# Patient Record
Sex: FEMALE | Race: WHITE | NOT HISPANIC OR LATINO | Employment: OTHER | URBAN - METROPOLITAN AREA
[De-identification: names, ages, dates, MRNs, and addresses within clinical notes are randomized per-mention and may not be internally consistent; named-entity substitution may affect disease eponyms.]

---

## 2017-01-18 ENCOUNTER — RADIANT APPOINTMENT (OUTPATIENT)
Dept: MAMMOGRAPHY | Facility: CLINIC | Age: 72
End: 2017-01-18
Payer: COMMERCIAL

## 2017-01-18 DIAGNOSIS — Z12.31 VISIT FOR SCREENING MAMMOGRAM: ICD-10-CM

## 2017-01-18 PROCEDURE — G0202 SCR MAMMO BI INCL CAD: HCPCS | Mod: TC

## 2017-03-31 ENCOUNTER — OFFICE VISIT (OUTPATIENT)
Dept: FAMILY MEDICINE | Facility: CLINIC | Age: 72
End: 2017-03-31
Payer: COMMERCIAL

## 2017-03-31 VITALS
SYSTOLIC BLOOD PRESSURE: 110 MMHG | HEART RATE: 65 BPM | TEMPERATURE: 96.6 F | OXYGEN SATURATION: 99 % | HEIGHT: 64 IN | WEIGHT: 120 LBS | RESPIRATION RATE: 12 BRPM | BODY MASS INDEX: 20.49 KG/M2 | DIASTOLIC BLOOD PRESSURE: 60 MMHG

## 2017-03-31 DIAGNOSIS — Z13.1 SCREENING FOR DIABETES MELLITUS: ICD-10-CM

## 2017-03-31 DIAGNOSIS — Z00.00 ROUTINE GENERAL MEDICAL EXAMINATION AT A HEALTH CARE FACILITY: Primary | ICD-10-CM

## 2017-03-31 DIAGNOSIS — R30.0 DYSURIA: ICD-10-CM

## 2017-03-31 DIAGNOSIS — R31.0 GROSS HEMATURIA: ICD-10-CM

## 2017-03-31 DIAGNOSIS — N81.0 URETHROCELE: ICD-10-CM

## 2017-03-31 DIAGNOSIS — Z78.9 NONSMOKER: ICD-10-CM

## 2017-03-31 DIAGNOSIS — Z13.220 LIPID SCREENING: ICD-10-CM

## 2017-03-31 DIAGNOSIS — Z85.42 HISTORY OF ENDOMETRIAL CANCER: ICD-10-CM

## 2017-03-31 DIAGNOSIS — N30.01 ACUTE CYSTITIS WITH HEMATURIA: ICD-10-CM

## 2017-03-31 DIAGNOSIS — N81.11 CYSTOCELE, MIDLINE: ICD-10-CM

## 2017-03-31 PROBLEM — E78.00 HYPERCHOLESTEROLEMIA: Status: ACTIVE | Noted: 2017-03-31

## 2017-03-31 LAB
ALBUMIN UR-MCNC: 30 MG/DL
APPEARANCE UR: CLEAR
BACTERIA #/AREA URNS HPF: ABNORMAL /HPF
BILIRUB UR QL STRIP: NEGATIVE
CHOLEST SERPL-MCNC: 182 MG/DL
COLOR UR AUTO: YELLOW
GLUCOSE SERPL-MCNC: 101 MG/DL (ref 70–99)
GLUCOSE UR STRIP-MCNC: NEGATIVE MG/DL
HDLC SERPL-MCNC: 61 MG/DL
HGB UR QL STRIP: ABNORMAL
KETONES UR STRIP-MCNC: 15 MG/DL
LDLC SERPL CALC-MCNC: 107 MG/DL
LEUKOCYTE ESTERASE UR QL STRIP: ABNORMAL
NITRATE UR QL: NEGATIVE
NON-SQ EPI CELLS #/AREA URNS LPF: ABNORMAL /LPF
NONHDLC SERPL-MCNC: 121 MG/DL
PH UR STRIP: 5.5 PH (ref 5–7)
RBC #/AREA URNS AUTO: ABNORMAL /HPF (ref 0–2)
SP GR UR STRIP: >1.03 (ref 1–1.03)
TRIGL SERPL-MCNC: 72 MG/DL
URN SPEC COLLECT METH UR: ABNORMAL
UROBILINOGEN UR STRIP-ACNC: 0.2 EU/DL (ref 0.2–1)
WBC #/AREA URNS AUTO: ABNORMAL /HPF (ref 0–2)

## 2017-03-31 PROCEDURE — 81001 URINALYSIS AUTO W/SCOPE: CPT | Performed by: FAMILY MEDICINE

## 2017-03-31 PROCEDURE — 36415 COLL VENOUS BLD VENIPUNCTURE: CPT | Performed by: FAMILY MEDICINE

## 2017-03-31 PROCEDURE — 80061 LIPID PANEL: CPT | Performed by: FAMILY MEDICINE

## 2017-03-31 PROCEDURE — 99397 PER PM REEVAL EST PAT 65+ YR: CPT | Performed by: FAMILY MEDICINE

## 2017-03-31 PROCEDURE — 82947 ASSAY GLUCOSE BLOOD QUANT: CPT | Performed by: FAMILY MEDICINE

## 2017-03-31 PROCEDURE — 99214 OFFICE O/P EST MOD 30 MIN: CPT | Mod: 25 | Performed by: FAMILY MEDICINE

## 2017-03-31 RX ORDER — CEPHALEXIN 500 MG/1
500 CAPSULE ORAL 3 TIMES DAILY
Qty: 30 CAPSULE | Refills: 0 | Status: SHIPPED | OUTPATIENT
Start: 2017-03-31 | End: 2017-09-11

## 2017-03-31 NOTE — PROGRESS NOTES
SUBJECTIVE:                                                            Afia Sylvester is a 71 year old female who presents for Preventive Visit.    Are you in the first 12 months of your Medicare Part B coverage?  No    Healthy Habits:    Do you get at least three servings of calcium containing foods daily (dairy, green leafy vegetables, etc.)? yes    Amount of exercise or daily activities, outside of work: 7 day(s) per week    Problems taking medications regularly No    Medication side effects: No    Have you had an eye exam in the past two years? no    Do you see a dentist twice per year? yes    Do you have sleep apnea, excessive snoring or daytime drowsiness?no    COGNITIVE SCREEN  1) Repeat 3 items (Banana, Sunrise, Chair)    2) Clock draw: NORMAL  3) 3 item recall: Recalls 3 objects  Results: 3 items recalled: COGNITIVE IMPAIRMENT LESS LIKELY    Mini-CogTM Copyright S Giana. Licensed by the author for use in [facility] second hand information company; reprinted with permission ([email or web address]@[email or web address]). All rights reserved.      URINARY TRACT SYMPTOMS      Duration: dysuria for 2 weeks + gross blood     Description  hematuria, nocturia x 0 and incontinence    Intensity:  mild    Accompanying signs and symptoms:  Fever/chills: no   Flank pain no   Nausea and vomiting: no   Vaginal symptoms: none  Abdominal/Pelvic Pain: no     History  History of frequent UTI's: no   History of kidney stones: no   Sexually Active: no   Possibility of pregnancy: No    Precipitating or alleviating factors: None    Therapies tried and outcome: fewer citric acid fruits-->  Better and drank lots of fluid      exam: ? Nodule ? On genitalia     Has known severe cystocele     VAGINAL GROWTH/MASS     -present for mos  -could be the cause of the hematuria   -nontender   -appears to be a urethrocele           Reviewed and updated as needed this visit by clinical staff    Reviewed and updated as needed this visit by Provider        Social History   Substance  Use Topics     Smoking status: Never Smoker     Smokeless tobacco: Never Used     Alcohol use Yes      Comment: wine spritzer - 1/month       The patient does not drink >3 drinks per day nor >7 drinks per week.    Today's PHQ-2 Score:   PHQ-2 ( 1999 [employer]) 3/31/2017 10/25/2013   Q1: Little interest or pleasure in doing things 0 0   Q2: Feeling down, depressed or hopeless 0 0   PHQ-2 Score 0 0       Do you feel safe in your environment - Yes    Do you have a Health Care Directive?: Yes: Advance Directive has been received and scanned.    Current providers sharing in care for this patient include:   Patient Care Team:  None as PCP - General      Hearing impairment: No    Ability to successfully perform activities of daily living: Yes, no assistance needed     Fall risk:  Fallen 2 or more times in the past year?: No  Any fall with injury in the past year?: No    Home safety:  none identified      The following health maintenance items are reviewed in Epic and correct as of today:  Health Maintenance   Topic Date Due     FALL RISK ASSESSMENT  10/25/2014     INFLUENZA VACCINE (SYSTEM ASSIGNED)  09/01/2017     LIPID SCREEN Q5 YR FEMALE (SYSTEM ASSIGNED)  10/25/2018     COLONOSCOPY Q5 YR INBASKET MESSAGE  11/11/2018     MAMMO SCREEN Q2 YR (SYSTEM ASSIGNED)  01/18/2019     ADVANCE DIRECTIVE PLANNING Q5 YRS (NO INBASKET)  11/24/2019     TETANUS IMMUNIZATION (SYSTEM ASSIGNED)  10/13/2021     DEXA SCAN SCREENING (SYSTEM ASSIGNED)  Completed     PNEUMOCOCCAL  Completed     HEPATITIS C SCREENING  Completed              ROS:  C: NEGATIVE for fever, chills, change in weight  I: NEGATIVE for worrisome rashes, moles or lesions  E: NEGATIVE for vision changes or irritation  E/M: NEGATIVE for ear, mouth and throat problems  R: NEGATIVE for significant cough or SOB  B: NEGATIVE for masses, tenderness or discharge  CV: NEGATIVE for chest pain, palpitations or peripheral edema  GI: NEGATIVE for nausea, abdominal pain, heartburn, or  "change in bowel habits   female: dysuria and hesitancy  M: NEGATIVE for significant arthralgias or myalgia  N: NEGATIVE for weakness, dizziness or paresthesias  E: NEGATIVE for temperature intolerance, skin/hair changes  H: NEGATIVE for bleeding problems  P: NEGATIVE for changes in mood or affect    Problem list, Medication list, Allergies, and Medical/Social/Surgical histories reviewed in [facility] and updated as appropriate.  Labs reviewed in EPIC  OBJECTIVE:                                                            /60 (BP Location: Right arm, Patient Position: Chair, Cuff Size: Adult Regular)  Pulse 65  Temp 96.6  F (35.9  C) (Tympanic)  Resp 12  Ht 5' 3.5\" (1.613 m)  Wt 120 lb (54.4 kg)  LMP  (LMP Unknown)  SpO2 99%  Breastfeeding? No  BMI 20.92 kg/m2 Estimated body mass index is 20.92 kg/(m^2) as calculated from the following:    Height as of this encounter: 5' 3.5\" (1.613 m).    Weight as of this encounter: 120 lb (54.4 kg).  EXAM:       ASSESSMENT / PLAN:                                                                ICD-10-CM    1. Routine general medical examination at a health care facility Z00.00    2. History of endometrial cancer Z85.42    3. Nonsmoker Z78.9        End of Life Planning:  Patient currently has an advanced directive: Yes.  Practitioner is supportive of decision.    COUNSELING:  Reviewed preventive health counseling, as reflected in patient instructions       Regular exercise       Healthy diet/nutrition        Estimated body mass index is 20.92 kg/(m^2) as calculated from the following:    Height as of this encounter: 5' 3.5\" (1.613 m).    Weight as of this encounter: 120 lb (54.4 kg).     reports that she has never smoked. She has never used smokeless tobacco.      Appropriate preventive services were discussed with this patient, including applicable screening as appropriate for cardiovascular disease, diabetes, osteopenia/osteoporosis, and glaucoma.  As appropriate for " age/gender, discussed screening for colorectal cancer, prostate cancer, breast cancer, and cervical cancer. Checklist reviewing preventive services available has been given to the patient.    Reviewed patients plan of care and provided an AVS. The Basic Care Plan (routine screening as documented in Health Maintenance) for Afia meets the Care Plan requirement. This Care Plan has been established and reviewed with the Patient.    Patient Instructions     Preventive Health Recommendations    Female Ages 65 +    Yearly exam:     See your health care provider every year in order to  o Review health changes.   o Discuss preventive care.    o Review your medicines if your doctor has prescribed any.      You no longer need a yearly Pap test unless you've had an abnormal Pap test in the past 10 years. If you have vaginal symptoms, such as bleeding or discharge, be sure to talk with your provider about a Pap test.      Every 1 to 2 years, have a mammogram.  If you are over 69, talk with your health care provider about whether or not you want to continue having screening mammograms.      Every 10 years, have a colonoscopy. Or, have a yearly FIT test (stool test). These exams will check for colon cancer.       Have a cholesterol test every 5 years, or more often if your doctor advises it.       Have a diabetes test (fasting glucose) every three years. If you are at risk for diabetes, you should have this test more often.       At age 65, have a bone density scan (DEXA) to check for osteoporosis (brittle bone disease).    Shots:    Get a flu shot each year.    Get a tetanus shot every 10 years.    Talk to your doctor about your pneumonia vaccines. There are now two you should receive - Pneumovax (PPSV 23) and Prevnar (PCV 13).    Talk to your doctor about the shingles vaccine.    Talk to your doctor about the hepatitis B vaccine.    Nutrition:     Eat at least 5 servings of fruits and vegetables each day.      Eat whole-grain  bread, whole-wheat pasta and brown rice instead of white grains and rice.      Talk to your provider about Calcium and Vitamin D.     Lifestyle    Exercise at least 150 minutes a week (30 minutes a day, 5 days a week). This will help you control your weight and prevent disease.      Limit alcohol to one drink per day.      No smoking.       Wear sunscreen to prevent skin cancer.       See your dentist twice a year for an exam and cleaning.      See your eye doctor every 1 to 2 years to screen for conditions such as glaucoma, macular degeneration and cataracts.    1. Please do your breast exam every mo, when you  Change the  calendar page or set an alarm on your cell phone Do a  visual check for dimples, inversion or indentation or any different position of the nipple Feel manually  for any 1cm or larger  size mass ie about the size of an almond Be sure to cover the entire area of both breasts : this extends back to the back on either side and from the collar bone to the bottom of the breasts where you can begin to feel ribs.    2. . Schedule your mammo at [facility]  At [address] at [phone]       3. Eat calcium : dairy and greens  Do not take calcium in pill form as it can plaque on the heart arteries and cause kidney stones  4. Colonoscopy for polyps in 2018  [place] [phone]    5. Your vitamin D is normal.  Please take 1,000 units in the summer and 2,000 units in the winter to maintain it   6. Recommend no artificial sweeteners as cause diabetes, gut frances disturbance     7. Urine lab in a mo f/u hematuria     8. GYN Spe refer     9 keflex  Qid for 2 d then tid     Time spent with the patient mins, more than 50% in counseling and coordinating care, Re above medical problems.  This time was  spent in review of data, the record, and in discussion and counselling  With > 50% face-to-face time     Exam of the UTI and the urethrocele and discussion of possibilities and the need for f/u, work up   Risk of urinary tract ca with persistent hematuria       Counseling Resources:  ATP IV Guidelines  Pooled Cohorts Equation Calculator  Breast Cancer Risk Calculator  FRAX Risk Assessment  ICSI Preventive Guidelines  Dietary Guidelines for Americans, 2010  USDA's MyPlate  ASA Prophylaxis  Lung CA Screening    Shiloh Lucio MD  [facility]

## 2017-03-31 NOTE — LETTER
"[facility]  [address] [place] [address]  [phone]    3/31/2017     Afia Sylvester  [address]  [facility] [address]    Dear Afia:    Here are the results of your latest lipid tests:    LDL Cholesterol Calculated   Date Value Ref Range Status   03/31/2017 107 (H) <100 mg/dL Final     Comment:     Above desirable:  100-129 mg/dl   Borderline High:  130-159 mg/dL   High:             160-189 mg/dL   Very high:       >189 mg/dl     ]  HDL Cholesterol   Date Value Ref Range Status   03/31/2017 61 >49 mg/dL Final   ]  Triglycerides   Date Value Ref Range Status   03/31/2017 72 <150 mg/dL Final     Comment:     Fasting specimen   ]  Cholesterol   Date Value Ref Range Status   03/31/2017 182 <200 mg/dL Final   ]    LDL is the \"bad\" cholesterol linked to heart disease and stroke.   HDL is the \"good\" choesterol and when it is high, it decreases the risk for above problems.  LDL and sugar are just a little high     Continue with a good diet and exercise  !!!  Follow a low-fat, low-cholesterol diet and get regular exercise.  Please feel free to call the clinic if you have any questions.    Sincerely,      Shiloh Lucio   "

## 2017-03-31 NOTE — NURSING NOTE
"Chief Complaint   Patient presents with     Wellness Visit     /60 (BP Location: Right arm, Patient Position: Chair, Cuff Size: Adult Regular)  Pulse 65  Temp 96.6  F (35.9  C) (Tympanic)  Resp 12  Ht 5' 3.5\" (1.613 m)  Wt 120 lb (54.4 kg)  LMP  (LMP Unknown)  SpO2 99%  Breastfeeding? No  BMI 20.92 kg/m2 Estimated body mass index is 20.92 kg/(m^2) as calculated from the following:    Height as of this encounter: 5' 3.5\" (1.613 m).    Weight as of this encounter: 120 lb (54.4 kg).  BP completed using cuff size: regular   Cheyanne Echevarria CMA    Health Maintenance Due   Topic Date Due     FALL RISK ASSESSMENT  10/25/2014     Health Maintenance reviewed at today's visit patient asked to schedule/complete:   None, Health Maintenance up to date.    "

## 2017-03-31 NOTE — MR AVS SNAPSHOT
After Visit Summary   3/31/2017    Afia Sylvester    MRN: [id number]           Patient Information     Date Of Birth          1945        Visit Information        Provider Department      3/31/2017 9:40 AM Shiloh Lucio MD [facility]        Today's Diagnoses     Routine general medical examination at a health care facility    -  1    History of endometrial cancer        Nonsmoker        Dysuria        Screening for diabetes mellitus        Lipid screening        Acute cystitis with hematuria        Gross hematuria          Care Instructions      Preventive Health Recommendations    Female Ages 65 +    Yearly exam:     See your health care provider every year in order to  o Review health changes.   o Discuss preventive care.    o Review your medicines if your doctor has prescribed any.      You no longer need a yearly Pap test unless you've had an abnormal Pap test in the past 10 years. If you have vaginal symptoms, such as bleeding or discharge, be sure to talk with your provider about a Pap test.      Every 1 to 2 years, have a mammogram.  If you are over 69, talk with your health care provider about whether or not you want to continue having screening mammograms.      Every 10 years, have a colonoscopy. Or, have a yearly FIT test (stool test). These exams will check for colon cancer.       Have a cholesterol test every 5 years, or more often if your doctor advises it.       Have a diabetes test (fasting glucose) every three years. If you are at risk for diabetes, you should have this test more often.       At age 65, have a bone density scan (DEXA) to check for osteoporosis (brittle bone disease).    Shots:    Get a flu shot each year.    Get a tetanus shot every 10 years.    Talk to your doctor about your pneumonia vaccines. There are now two you should receive - Pneumovax (PPSV 23) and Prevnar (PCV 13).    Talk to your doctor about the shingles  vaccine.    Talk to your doctor about the hepatitis B vaccine.    Nutrition:     Eat at least 5 servings of fruits and vegetables each day.      Eat whole-grain bread, whole-wheat pasta and brown rice instead of white grains and rice.      Talk to your provider about Calcium and Vitamin D.     Lifestyle    Exercise at least 150 minutes a week (30 minutes a day, 5 days a week). This will help you control your weight and prevent disease.      Limit alcohol to one drink per day.      No smoking.       Wear sunscreen to prevent skin cancer.       See your dentist twice a year for an exam and cleaning.      See your eye doctor every 1 to 2 years to screen for conditions such as glaucoma, macular degeneration and cataracts.    1. Please do your breast exam every mo, when you  Change the  calendar page or set an alarm on your cell phone Do a  visual check for dimples, inversion or indentation or any different position of the nipple Feel manually  for any 1cm or larger  size mass ie about the size of an almond Be sure to cover the entire area of both breasts : this extends back to the back on either side and from the collar bone to the bottom of the breasts where you can begin to feel ribs.    2. . Schedule your mammo at [facility]  At [address] at [phone]       3. Eat calcium : dairy and greens  Do not take calcium in pill form as it can plaque on the heart arteries and cause kidney stones  4. Colonoscopy for polyps in 2018  [phone]    5. Your vitamin D is normal.  Please take 1,000 units in the summer and 2,000 units in the winter to maintain it   6. Recommend no artificial sweeteners as cause diabetes, gut frances disturbance         Follow-ups after your visit        Future tests that were ordered for you today     Open Future Orders        Priority Expected Expires Ordered    UA reflex to Microscopic and Culture Routine  4/30/2017 3/31/2017            Who to contact     If you have  "questions or need follow up information about today's clinic visit or your schedule please contact [facility] directly at [phone].  Normal or non-critical lab and imaging results will be communicated to you by MyChart, letter or phone within 4 business days after the clinic has received the results. If you do not hear from us within 7 days, please contact the clinic through Validroidhart or phone. If you have a critical or abnormal lab result, we will notify you by phone as soon as possible.  Submit refill requests through DecoSnap or call your pharmacy and they will forward the refill request to us. Please allow 3 business days for your refill to be completed.          Additional Information About Your Visit        DecoSnap Information     Deco[employer]ap gives you secure access to your electronic health record. If you see a primary care provider, you can also send messages to your care team and make appointments. If you have questions, please call your primary care clinic.  If you do not have a primary care provider, please call [phone] and they will assist you.        Care EveryWhere ID     This is your Care EveryWhere ID. This could be used by other organizations to access your [place] medical records  [id number]        Your Vitals Were     Pulse Temperature Respirations Height Last Period Pulse Oximetry    65 96.6  F (35.9  C) (Tympanic) 12 5' 3.5\" (1.613 m) (LMP Unknown) 99%    Breastfeeding? BMI (Body Mass Index)                No 20.92 kg/m2           Blood Pressure from Last 3 Encounters:   03/31/17 110/60   10/25/13 122/70    Weight from Last 3 Encounters:   03/31/17 120 lb (54.4 kg)   10/25/13 126 lb (57.2 kg)              We Performed the Following     Glucose     Lipid panel reflex to direct LDL     OFFICE/OUTPT VISIT,EST,LEVL IV     UA reflex to Microscopic and Culture        Primary Care Provider    None       No address on file        Thank you!     Thank you for choosing " [facility]  for your care. Our goal is always to provide you with excellent care. Hearing back from our patients is one way we can continue to improve our services. Please take a few minutes to complete the written survey that you may receive in the mail after your visit with us. Thank you!             Your Updated Medication List - Protect others around you: Learn how to safely use, store and throw away your medicines at www.disposemymeds.org.          This list is accurate as of: 3/31/17 10:41 AM.  Always use your most recent med list.                   Brand Name Dispense Instructions for use    CALCIUM 600 PO          NATURAL VITAMIN D OR

## 2017-03-31 NOTE — LETTER
[facility]  [address]  [address]  [facility] [id number] [phone]                                                                                                           Afia Sylvester  [address]  [place] [address]    March 31, 2017      Dear Afia,    The results of your recent tests were reviewed and are enclosed.     1.) With the positive UA, I opted to treat for a UTI =cystitis with hematuria-gross     2.) The blood in the urine could be all from the urethrocele, but do not want to miss a UTI     3.) the urethrocele is quite large and needs to be seen /excised by Gyn   My choice is [facility]in [place]--one of the [facility] or [facility]     4.) the cystocele is evident, but not a cause of any of this  And probably could be left alone     5.) it was nice to see you today !!!       See about April 22nd !!! Tell Gloria what movie and what food you'll bring :)     Results for orders placed or performed in visit on 03/31/17   UA reflex to Microscopic and Culture   Result Value Ref Range    Color Urine Yellow     Appearance Urine Clear     Glucose Urine Negative NEG mg/dL    Bilirubin Urine Negative NEG    Ketones Urine 15 (A) NEG mg/dL    Specific Gravity Urine >1.030 1.003 - 1.035    Blood Urine Large (A) NEG    pH Urine 5.5 5.0 - 7.0 pH    Protein Albumin Urine 30 (A) NEG mg/dL    Urobilinogen Urine 0.2 0.2 - 1.0 EU/dL    Nitrite Urine Negative NEG    Leukocyte Esterase Urine Trace (A) NEG    Source Midstream Urine    Urine Microscopic   Result Value Ref Range    WBC Urine 5-10 (A) 0 - 2 /HPF    RBC Urine 5-10 (A) 0 - 2 /HPF    Squamous Epithelial /LPF Urine Few FEW /LPF    Bacteria Urine Moderate (A) NEG /HPF       Thank you for choosing [facility].  We appreciate the opportunity to serve you and look forward to supporting your healthcare needs in the future.    If you have any questions or concerns, please  call me or my staff at [phone].      Sincerely,    Shiloh Lucio MD

## 2017-03-31 NOTE — PATIENT INSTRUCTIONS
Preventive Health Recommendations    Female Ages 65 +    Yearly exam:     See your health care provider every year in order to  o Review health changes.   o Discuss preventive care.    o Review your medicines if your doctor has prescribed any.      You no longer need a yearly Pap test unless you've had an abnormal Pap test in the past 10 years. If you have vaginal symptoms, such as bleeding or discharge, be sure to talk with your provider about a Pap test.      Every 1 to 2 years, have a mammogram.  If you are over 69, talk with your health care provider about whether or not you want to continue having screening mammograms.      Every 10 years, have a colonoscopy. Or, have a yearly FIT test (stool test). These exams will check for colon cancer.       Have a cholesterol test every 5 years, or more often if your doctor advises it.       Have a diabetes test (fasting glucose) every three years. If you are at risk for diabetes, you should have this test more often.       At age 65, have a bone density scan (DEXA) to check for osteoporosis (brittle bone disease).    Shots:    Get a flu shot each year.    Get a tetanus shot every 10 years.    Talk to your doctor about your pneumonia vaccines. There are now two you should receive - Pneumovax (PPSV 23) and Prevnar (PCV 13).    Talk to your doctor about the shingles vaccine.    Talk to your doctor about the hepatitis B vaccine.    Nutrition:     Eat at least 5 servings of fruits and vegetables each day.      Eat whole-grain bread, whole-wheat pasta and brown rice instead of white grains and rice.      Talk to your provider about Calcium and Vitamin D.     Lifestyle    Exercise at least 150 minutes a week (30 minutes a day, 5 days a week). This will help you control your weight and prevent disease.      Limit alcohol to one drink per day.      No smoking.       Wear sunscreen to prevent skin cancer.       See your dentist twice a year for an exam and cleaning.      See your  eye doctor every 1 to 2 years to screen for conditions such as glaucoma, macular degeneration and cataracts.    1. Please do your breast exam every mo, when you  Change the  calendar page or set an alarm on your cell phone Do a  visual check for dimples, inversion or indentation or any different position of the nipple Feel manually  for any 1cm or larger  size mass ie about the size of an almond Be sure to cover the entire area of both breasts : this extends back to the back on either side and from the collar bone to the bottom of the breasts where you can begin to feel ribs.    2. . Schedule your mammo at [facility]  At [address] at [phone]       3. Eat calcium : dairy and greens  Do not take calcium in pill form as it can plaque on the heart arteries and cause kidney stones  4. Colonoscopy for polyps in 2018  [phone]    5. Your vitamin D is normal.  Please take 1,000 units in the summer and 2,000 units in the winter to maintain it   6. Recommend no artificial sweeteners as cause diabetes, gut frances disturbance     7. Urine lab in a mo f/u hematuria     8. GYN Spe refer     9 keflex  Qid for 2 d then tid

## 2017-05-12 ENCOUNTER — TRANSFERRED RECORDS (OUTPATIENT)
Dept: HEALTH INFORMATION MANAGEMENT | Facility: CLINIC | Age: 72
End: 2017-05-12

## 2017-08-28 ENCOUNTER — TELEPHONE (OUTPATIENT)
Dept: FAMILY MEDICINE | Facility: CLINIC | Age: 72
End: 2017-08-28

## 2017-08-28 NOTE — TELEPHONE ENCOUNTER
Reason for Call:  Other appointment    Detailed comments:  Dr Sukhjinder Sylvester is calling to see if Afia can establish  care with Dr Rodriguez      Phone Number Patient can be reached at: Other phone number:  # for , Dr Sukhjinder Sylvester    Best Time: any    Can we leave a detailed message on this number? YES    Call taken on 8/28/2017 at 11:15 AM by Brook Dickerson

## 2017-08-29 ENCOUNTER — TELEPHONE (OUTPATIENT)
Dept: FAMILY MEDICINE | Facility: CLINIC | Age: 72
End: 2017-08-29

## 2017-08-29 NOTE — TELEPHONE ENCOUNTER
Yes, I can see her - ok for regular clinic as memory clinic is backed up  Maybe a 4-5pm Monday slot?

## 2017-08-29 NOTE — TELEPHONE ENCOUNTER
Patient  Sukhjinder is calling and would like to know if Dr. Tang will accept  patient as a new patient for memories concerns and hearing concerns. Please follow up with patient      deana

## 2017-09-10 PROBLEM — N30.01 ACUTE CYSTITIS WITH HEMATURIA: Status: RESOLVED | Noted: 2017-03-31 | Resolved: 2017-09-10

## 2017-09-11 ENCOUNTER — OFFICE VISIT (OUTPATIENT)
Dept: FAMILY MEDICINE | Facility: CLINIC | Age: 72
End: 2017-09-11
Payer: COMMERCIAL

## 2017-09-11 VITALS
DIASTOLIC BLOOD PRESSURE: 77 MMHG | TEMPERATURE: 97.5 F | OXYGEN SATURATION: 100 % | SYSTOLIC BLOOD PRESSURE: 136 MMHG | HEIGHT: 64 IN | HEART RATE: 88 BPM | WEIGHT: 118.2 LBS | BODY MASS INDEX: 20.18 KG/M2

## 2017-09-11 DIAGNOSIS — H91.90 HEARING LOSS, UNSPECIFIED HEARING LOSS TYPE, UNSPECIFIED LATERALITY: ICD-10-CM

## 2017-09-11 DIAGNOSIS — F41.9 ANXIETY: ICD-10-CM

## 2017-09-11 DIAGNOSIS — R41.3 MEMORY CHANGES: Primary | ICD-10-CM

## 2017-09-11 DIAGNOSIS — M81.0 OSTEOPOROSIS, UNSPECIFIED OSTEOPOROSIS TYPE, UNSPECIFIED PATHOLOGICAL FRACTURE PRESENCE: ICD-10-CM

## 2017-09-11 LAB — HGB BLD-MCNC: 12.5 G/DL (ref 11.7–15.7)

## 2017-09-11 PROCEDURE — 80053 COMPREHEN METABOLIC PANEL: CPT | Performed by: INTERNAL MEDICINE

## 2017-09-11 PROCEDURE — 82607 VITAMIN B-12: CPT | Performed by: INTERNAL MEDICINE

## 2017-09-11 PROCEDURE — 82306 VITAMIN D 25 HYDROXY: CPT | Performed by: INTERNAL MEDICINE

## 2017-09-11 PROCEDURE — 84443 ASSAY THYROID STIM HORMONE: CPT | Performed by: INTERNAL MEDICINE

## 2017-09-11 PROCEDURE — 36415 COLL VENOUS BLD VENIPUNCTURE: CPT | Performed by: INTERNAL MEDICINE

## 2017-09-11 PROCEDURE — 99214 OFFICE O/P EST MOD 30 MIN: CPT | Performed by: INTERNAL MEDICINE

## 2017-09-11 PROCEDURE — 85018 HEMOGLOBIN: CPT | Performed by: INTERNAL MEDICINE

## 2017-09-11 RX ORDER — CHOLECALCIFEROL (VITAMIN D3) 50 MCG
1 TABLET ORAL
COMMUNITY
End: 2022-01-01

## 2017-09-11 RX ORDER — ESCITALOPRAM OXALATE 5 MG/1
5 TABLET ORAL DAILY
Qty: 30 TABLET | Refills: 2 | Status: SHIPPED | OUTPATIENT
Start: 2017-09-11 | End: 2017-12-22

## 2017-09-11 NOTE — NURSING NOTE
"Chief Complaint   Patient presents with     Memory Loss       Initial /77 (BP Location: Right arm, Patient Position: Chair, Cuff Size: Adult Regular)  Pulse 88  Temp 97.5  F (36.4  C) (Oral)  Ht 5' 3.5\" (1.613 m)  Wt 118 lb 3.2 oz (53.6 kg)  LMP  (LMP Unknown)  SpO2 100%  BMI 20.61 kg/m2 Estimated body mass index is 20.61 kg/(m^2) as calculated from the following:    Height as of this encounter: 5' 3.5\" (1.613 m).    Weight as of this encounter: 118 lb 3.2 oz (53.6 kg).  Medication Reconciliation: complete   Natalie Armando MA  "

## 2017-09-11 NOTE — PATIENT INSTRUCTIONS
Labs and flu shot today  Start the Lexapro for anxiety - ok to cut in half for the first few days if you wish to really ease into it  Hearing eval elsewhere  Follow up in about 6-8 weeks on medication and plan MoCA at that time

## 2017-09-11 NOTE — PROGRESS NOTES
"MEMORY EVALUATION CLINIC - INITIAL EVALUATION    HPI: Afia Sylvester is a very pleasant 72yoF here today with her , Sukhjinder. She and her family have some concerns of mild memory changes over the past year or so as well as increased anxiety. She does not carry a past significant history of depression or anxiety but has always been very tender-hearted. Over a year ago her young grandsons and their folks had to move out of state to the [facility] and that has been very hard on Afia as she misses them so much. There may be not only some mild memory loss but also possibly some mild judgement changes as well as some disconnect. She at times is forgetting more of the details of stories. She is sleeping well and physically feels well. No neurologic symptoms other than some hearing loss and they'd like her to have a hearing evaluation. She has had gradual decrease in hearing bilaterally without pain, tinnitus or dizziness. Her  Sukhjinder who is a retired physician already r/o wax by checking in her ears. The family is wondering more about her memory and wonder about it as \"the elephant in the room\".  too wonders about differential dx of MCI and age related memory changes.      Social Hx (Employment/Schooling):  to  Sukhjinder for several years and they do have grown children and many grandchildren. 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  Likes to go hiking.  PMH: Reviewed. Most notable for: Nothing pertinent  Is there a h/o delirium, CVA/TIA, TBI, substances, parkinsonism?: No  Family Hx: Father had h/o Parkinson's disease and her mother was \"sharp as a tack\" at 92    ADLs: Independent  Driving: Independent without difficulty  Finances: Still manages finances well  Shopping/housekeeping/meal prep: No difficulty  Medications: Just supplements  Home situation: Home with   Support: Family  Behaviors/Hallucinations/Delusions: Possibly " "mild judgement changes and slightly more tearful/emotional but has always been a tender hearted lady    REVIEW OF SYSTEMS: Detailed as above     OBJECTIVE:    /77 (BP Location: Right arm, Patient Position: Chair, Cuff Size: Adult Regular)  Pulse 88  Temp 97.5  F (36.4  C) (Oral)  Ht 5' 3.5\" (1.613 m)  Wt 118 lb 3.2 oz (53.6 kg)  LMP  (LMP Unknown)  SpO2 100%  BMI 20.61 kg/m2  Alert, robust, NAD, healthy appearing  Nicely dressed  Tender-hearted and tearful discussing her grandsons moving away  Good fund of knowledge of current events  Doesn't seem to be repetitive  No tremor  Normal speech    INVESTIGATIONS: TSH and B12 being drawn today      ASSESSMENT/PLAN:       1. Memory changes  Could be multifactorial: stress, anxiety, hearing loss, life changes  Start with control of some anxiety hopefully and then assess with MoCA  Has strong family support  - Hemoglobin  - Comprehensive metabolic panel (BMP + Alb, Alk Phos, ALT, AST, Total. Bili, TP)  - TSH with free T4 reflex  - Vitamin B12    2. Anxiety  New medication   - escitalopram (LEXAPRO) 5 MG tablet; Take 1 tablet (5 mg) by mouth daily  Dispense: 30 tablet; Refill: 2    3. Hearing loss, unspecified hearing loss type, unspecified laterality  They will schedule with audiologist    4. Osteoporosis, unspecified osteoporosis type, unspecified pathological fracture presence  - Vitamin D Deficiency    Patient Instructions   Labs and flu shot today  Start the Lexapro for anxiety - ok to cut in half for the first few days if you wish to really ease into it  Hearing eval elsewhere  Follow up in about 6-8 weeks on medication and plan MoCA at that time      Teagan Tang, DO  Internal Medicine and Geriatrics   [facility]    "

## 2017-09-11 NOTE — MR AVS SNAPSHOT
After Visit Summary   9/11/2017    Afia Sylvester    MRN: [id number]           Patient Information     Date Of Birth          1945        Visit Information        Provider Department      9/11/2017 4:00 PM Teagan Tang, DO [facility]        Today's Diagnoses     Memory changes    -  1    Anxiety        Osteoporosis, unspecified osteoporosis type, unspecified pathological fracture presence          Care Instructions    Labs and flu shot today  Start the Lexapro for anxiety - ok to cut in half for the first few days if you wish to really ease into it  Hearing eval elsewhere  Follow up in about 6-8 weeks on medication and plan MoCA at that time          Follow-ups after your visit        Who to contact     If you have questions or need follow up information about today's clinic visit or your schedule please contact [facility] directly at [phone].  Normal or non-critical lab and imaging results will be communicated to you by [address], letter or phone within 4 business days after the clinic has received the results. If you do not hear from us within 7 days, please contact the clinic through [address] or phone. If you have a critical or abnormal lab result, we will notify you by phone as soon as possible.  Submit refill requests through [employer] or call your pharmacy and they will forward the refill request to us. Please allow 3 business days for your refill to be completed.          Additional Information About Your Visit        MyChart Information     [employer] gives you secure access to your electronic health record. If you see a primary care provider, you can also send messages to your care team and make appointments. If you have questions, please call your primary care clinic.  If you do not have a primary care provider, please call [phone] and they will assist you.        Care EveryWhere ID     This is your Care EveryWhere ID. This could be used by other  "organizations to access your [facility] medical records  [id number]        Your Vitals Were     Pulse Temperature Height Last Period Pulse Oximetry BMI (Body Mass Index)    88 97.5  F (36.4  C) (Oral) 5' 3.5\" (1.613 m) (LMP Unknown) 100% 20.61 kg/m2       Blood Pressure from Last 3 Encounters:   09/11/17 136/77   03/31/17 110/60   10/25/13 122/70    Weight from Last 3 Encounters:   09/11/17 118 lb 3.2 oz (53.6 kg)   03/31/17 120 lb (54.4 kg)   10/25/13 126 lb (57.2 kg)              We Performed the Following     Comprehensive metabolic panel (BMP + Alb, Alk Phos, ALT, AST, Total. Bili, TP)     Hemoglobin     TSH with free T4 reflex     Vitamin B12     Vitamin D Deficiency          Today's Medication Changes          These changes are accurate as of: 9/11/17  4:56 PM.  If you have any questions, ask your nurse or doctor.               Start taking these medicines.        Dose/Directions    escitalopram 5 MG tablet   Commonly known as:  LEXAPRO   Used for:  Anxiety   Started by:  Teagan Tang DO        Dose:  5 mg   Take 1 tablet (5 mg) by mouth daily   Quantity:  30 tablet   Refills:  2         Stop taking these medicines if you haven't already. Please contact your care team if you have questions.     NATURAL VITAMIN D OR   Stopped by:  Teagan Tang DO                Where to get your medicines      These medications were sent to [facility] [address] [place] [address] AV AT [address] [address]    Hours:  24-hours Phone:  [phone]     escitalopram 5 MG tablet                Primary Care Provider Office Phone # Fax #    Teagan Tang [phone] [address] [facility] [address] [address]        Equal Access to Services     ROCCO MELGOZA AH: Cate bolanos hadasho Soomaali, waaxda luqadaha, qaybta kaalmada adeegyada, janneth fuller. [facility] [phone].    ATENCIÓN: Si habla " español, tiene a rose disposición servicios gratuitos de asistencia lingüística. Lino mcclain [phone].    We comply with applicable federal civil rights laws and [place] laws. We do not discriminate on the basis of race, color, national origin, age, disability sex, sexual orientation or gender identity.            Thank you!     Thank you for choosing [facility]  for your care. Our goal is always to provide you with excellent care. Hearing back from our patients is one way we can continue to improve our services. Please take a few minutes to complete the written survey that you may receive in the mail after your visit with us. Thank you!             Your Updated Medication List - Protect others around you: Learn how to safely use, store and throw away your medicines at www.disposemymeds.org.          This list is accurate as of: 9/11/17  4:56 PM.  Always use your most recent med list.                   Brand Name Dispense Instructions for use Diagnosis    CALCIUM 600 PO           escitalopram 5 MG tablet    LEXAPRO    30 tablet    Take 1 tablet (5 mg) by mouth daily    Anxiety       vitamin D 2000 UNITS tablet      Take 1 tablet by mouth daily

## 2017-09-12 LAB
ALBUMIN SERPL-MCNC: 3.9 G/DL (ref 3.4–5)
ALP SERPL-CCNC: 75 U/L (ref 40–150)
ALT SERPL W P-5'-P-CCNC: 31 U/L (ref 0–50)
ANION GAP SERPL CALCULATED.3IONS-SCNC: 6 MMOL/L (ref 3–14)
AST SERPL W P-5'-P-CCNC: 21 U/L (ref 0–45)
BILIRUB SERPL-MCNC: 0.3 MG/DL (ref 0.2–1.3)
BUN SERPL-MCNC: 23 MG/DL (ref 7–30)
CALCIUM SERPL-MCNC: 9 MG/DL (ref 8.5–10.1)
CHLORIDE SERPL-SCNC: 108 MMOL/L (ref 94–109)
CO2 SERPL-SCNC: 28 MMOL/L (ref 20–32)
CREAT SERPL-MCNC: 0.64 MG/DL (ref 0.52–1.04)
DEPRECATED CALCIDIOL+CALCIFEROL SERPL-MC: 29 UG/L (ref 20–75)
GFR SERPL CREATININE-BSD FRML MDRD: >90 ML/MIN/1.7M2
GLUCOSE SERPL-MCNC: 99 MG/DL (ref 70–99)
POTASSIUM SERPL-SCNC: 4.2 MMOL/L (ref 3.4–5.3)
PROT SERPL-MCNC: 7.2 G/DL (ref 6.8–8.8)
SODIUM SERPL-SCNC: 142 MMOL/L (ref 133–144)
TSH SERPL DL<=0.005 MIU/L-ACNC: 2.73 MU/L (ref 0.4–4)
VIT B12 SERPL-MCNC: 590 PG/ML (ref 193–986)

## 2017-09-22 ENCOUNTER — TELEPHONE (OUTPATIENT)
Dept: FAMILY MEDICINE | Facility: CLINIC | Age: 72
End: 2017-09-22

## 2017-09-22 NOTE — TELEPHONE ENCOUNTER
Reason for Call:  Request for results:    Name of test or procedure: Lab    Date of test of procedure: 9/11    Location of the test or procedure: [facility]    OK to leave the result message on voice mail or with a family member? YES    Phone number Patient can be reached at:  [phone]    Additional comments: Pt is requesting a letter with the results be mailed to he    Call taken on 9/22/2017 at 9:15 AM by Beverly Scott

## 2017-09-22 NOTE — TELEPHONE ENCOUNTER
Mailed letter with results with note of who to contact if need help with MY CHART access or want deactivated.

## 2017-09-22 NOTE — LETTER
[facility]  [address] [facility]  [address]  [place]  [address]  Tel: [phone]    September 22, 2017    Afia Sylvester  [address]  [facility] [address]        Dear Afia and Sukhjinder,     It was so nice to meet you this week!     I'm pleased to report that your lab work is all within normal limits.     Please continue with the plan of care as we discussed and let me know if you have any questions/concerns. Thanks!    Sincerely,    Teagan Tang,  / kiet           Enclosure: Lab Results    (These results are viewable via MY CHART.   If you are having trouble accessing your account call  [phone].  Ii you would like your MY CHART account de-activated so that you receive results via mail or phone in the future please let us know. )

## 2017-10-12 PROBLEM — F41.9 ANXIETY: Status: ACTIVE | Noted: 2017-10-12

## 2017-10-12 PROBLEM — H91.90 HEARING LOSS, UNSPECIFIED HEARING LOSS TYPE, UNSPECIFIED LATERALITY: Chronic | Status: ACTIVE | Noted: 2017-09-11

## 2017-10-12 PROBLEM — R41.3 MEMORY CHANGES: Status: ACTIVE | Noted: 2017-10-12

## 2017-10-12 PROBLEM — R31.0 GROSS HEMATURIA: Status: RESOLVED | Noted: 2017-03-31 | Resolved: 2017-10-12

## 2017-12-19 ENCOUNTER — TRANSFERRED RECORDS (OUTPATIENT)
Dept: HEALTH INFORMATION MANAGEMENT | Facility: CLINIC | Age: 72
End: 2017-12-19

## 2017-12-19 DIAGNOSIS — F41.9 ANXIETY: ICD-10-CM

## 2017-12-21 RX ORDER — ESCITALOPRAM OXALATE 5 MG/1
TABLET ORAL
OUTPATIENT
Start: 2017-12-21

## 2017-12-21 NOTE — TELEPHONE ENCOUNTER
Requested Prescriptions   Pending Prescriptions Disp Refills     escitalopram (LEXAPRO) 5 MG tablet [Pharmacy Med Name: ESCITALOPRAM 5MG TABLETS] 30 tablet 0     Sig: TAKE 1 TABLET(5 MG) BY MOUTH DAILY    SSRIs Protocol Passed    12/19/2017 10:35 AM       Passed - Recent or future visit with authorizing provider    Patient had office visit in the last year or has a visit in the next 30 days with authorizing provider.  See chart review.              Passed - Medication is NOT Bupropion    If the medication is Bupropion (Wellbutrin), and the patient is taking for smoking cessation; OK to refill.         Passed - Patient is age 18 or older       Passed - No active pregnancy on record       Passed - No positive pregnancy test in last 12 months        Rx refused. Duplicate request. Pharmacy notified.  Rx sent 9/11/17, #90 with 2 refills   Dahlia JAUREGUI RN

## 2017-12-22 DIAGNOSIS — F41.9 ANXIETY: ICD-10-CM

## 2017-12-27 RX ORDER — ESCITALOPRAM OXALATE 5 MG/1
TABLET ORAL
Qty: 30 TABLET | Refills: 0 | Status: SHIPPED | OUTPATIENT
Start: 2017-12-27 | End: 2018-01-09

## 2017-12-27 NOTE — TELEPHONE ENCOUNTER
Last OV 9/11/17, escitalopram started for anxiety, follow up 6-8 weeks     Next 5 appointments (look out 90 days)     Jan 09, 2018  1:30 PM CST   [facility]    [address] [facility] [address]   [phone]                Medication is being filled for 1 time refill only due to:  Patient due for med follow up    Dahlia JAUREGUI RN    Requested Prescriptions   Pending Prescriptions Disp Refills     escitalopram (LEXAPRO) 5 MG tablet [Pharmacy Med Name: ESCITALOPRAM 5MG TABLETS] 30 tablet 0     Sig: TAKE 1 TABLET(5 MG) BY MOUTH DAILY    SSRIs Protocol Passed    12/22/2017  3:13 PM       Passed - Recent or future visit with authorizing provider    Patient had office visit in the last year or has a visit in the next 30 days with authorizing provider.  See chart review.              Passed - Patient is age 18 or older       Passed - No active pregnancy on record       Passed - No positive pregnancy test in last 12 months

## 2018-01-09 ENCOUNTER — OFFICE VISIT (OUTPATIENT)
Dept: FAMILY MEDICINE | Facility: CLINIC | Age: 73
End: 2018-01-09
Payer: COMMERCIAL

## 2018-01-09 VITALS
SYSTOLIC BLOOD PRESSURE: 120 MMHG | OXYGEN SATURATION: 100 % | HEIGHT: 64 IN | WEIGHT: 115 LBS | BODY MASS INDEX: 19.63 KG/M2 | TEMPERATURE: 97.3 F | DIASTOLIC BLOOD PRESSURE: 64 MMHG | HEART RATE: 83 BPM

## 2018-01-09 DIAGNOSIS — R41.89 COGNITIVE CHANGES: Primary | ICD-10-CM

## 2018-01-09 DIAGNOSIS — F41.9 ANXIETY: ICD-10-CM

## 2018-01-09 PROCEDURE — 99215 OFFICE O/P EST HI 40 MIN: CPT | Performed by: INTERNAL MEDICINE

## 2018-01-09 RX ORDER — ESCITALOPRAM OXALATE 5 MG/1
TABLET ORAL
Qty: 30 TABLET | Refills: 0 | Status: CANCELLED | OUTPATIENT
Start: 2018-01-09

## 2018-01-09 RX ORDER — ESCITALOPRAM OXALATE 10 MG/1
10 TABLET ORAL DAILY
Qty: 90 TABLET | Refills: 1 | Status: SHIPPED | OUTPATIENT
Start: 2018-01-09 | End: 2018-08-10

## 2018-01-09 ASSESSMENT — ANXIETY QUESTIONNAIRES
1. FEELING NERVOUS, ANXIOUS, OR ON EDGE: SEVERAL DAYS
7. FEELING AFRAID AS IF SOMETHING AWFUL MIGHT HAPPEN: NOT AT ALL
3. WORRYING TOO MUCH ABOUT DIFFERENT THINGS: SEVERAL DAYS
5. BEING SO RESTLESS THAT IT IS HARD TO SIT STILL: NOT AT ALL
IF YOU CHECKED OFF ANY PROBLEMS ON THIS QUESTIONNAIRE, HOW DIFFICULT HAVE THESE PROBLEMS MADE IT FOR YOU TO DO YOUR WORK, TAKE CARE OF THINGS AT HOME, OR GET ALONG WITH OTHER PEOPLE: NOT DIFFICULT AT ALL
GAD7 TOTAL SCORE: 3
2. NOT BEING ABLE TO STOP OR CONTROL WORRYING: NOT AT ALL
6. BECOMING EASILY ANNOYED OR IRRITABLE: NOT AT ALL

## 2018-01-09 ASSESSMENT — PATIENT HEALTH QUESTIONNAIRE - PHQ9: 5. POOR APPETITE OR OVEREATING: SEVERAL DAYS

## 2018-01-09 NOTE — NURSING NOTE
"Chief Complaint   Patient presents with     Follow Up For     Memory Loss       Initial /64 (BP Location: Right arm, Cuff Size: Adult Regular)  Pulse 83  Temp 97.3  F (36.3  C) (Oral)  Ht 5' 3.5\" (1.613 m)  Wt 115 lb (52.2 kg)  LMP  (LMP Unknown)  SpO2 100%  BMI 20.05 kg/m2 Estimated body mass index is 20.05 kg/(m^2) as calculated from the following:    Height as of this encounter: 5' 3.5\" (1.613 m).    Weight as of this encounter: 115 lb (52.2 kg).  Medication Reconciliation: complete       Ana Garrett CMA      "

## 2018-01-09 NOTE — PROGRESS NOTES
"  SUBJECTIVE:   Afia Sylvester is a 72 year old female who presents to clinic today for the following health issues:    Afia is here with  Sukhjinder and daughter Dahlia to f/u on memory changes and anxiety. Several months ago Lexapro was started.    Says Lexapro is helpful but still has some worries about dementia; still functioning well and remembers to take her medications. Says she doesn't forget anything big and Sukhjinder verifies this to be true. Making appointments on time and not loosing things. Feels totally capable driving; there was only one episode of getting anxious b/c of going to a new place and it was getting dark out in an unfamiliar area of town and was racing to get to grandson's  before it closed so she problem solved and called her daughter who came and helped her out which was good. She drove home from the cabin just two days ago with Sukhjinder riding along with her and she did very well according to Sukhjinder. Incidentally they have decided at night to always ride together for each of their own benefits d/t age.    Sukhjinder says she does have repetition of questions and some \"unawareness\" of past conversations which she thinks may be r/t hearing loss (currently being evaluated by ENT and audiology).     No side effects from Lexapro at all but within the first few weeks she was more relaxed overall. Had a wonderful holiday and she made several large meals that turned out well for the family. Dahlia would like her mom to eat more as is concerned her mom prepares meals for others and forgets to eat. Also the kids noted they have appreciated the Lexapro as Afia is more relaxed and calls the kids worried less often.    Looking forward to visiting family out [facility] in early April.    Problem list and histories reviewed & adjusted, as indicated.    ROS:  Detailed as above     OBJECTIVE:     /64 (BP Location: Right arm, Cuff Size: Adult Regular)  Pulse 83  Temp 97.3  F (36.3  C) (Oral)  Ht 5' 3.5\" (1.613 " "m)  Wt 115 lb (52.2 kg)  LMP  (LMP Unknown)  SpO2 100%  BMI 20.05 kg/m2  Body mass index is 20.05 kg/(m^2).  Very tearful at times and seems to get wrapped up in her own thoughts and frustrated at herself for not knowing some portions of the MoCA  Eventually able to get Luria appropriately with some practice  Walks without assist device  ALESSANDRO-7 SCORE 1/9/2018   Total Score 3   MoCA 21/30 (2, 3, 1, 1, 0, 0, 1, 2, 5, 6). Struggled with Trails B and cube. Able to draw the [place] of the clock with all the correct numbers but struggled over hand placement (couldn't get past thinking about the \"10\"). Visibly shaking and anxious as she was writing. 3/3 naming. Struggled with serial 7s. Unable to spell \"world\" backwards. Also got very mixed up with the sentence repetition and we even tried a few times to go over this but still she struggled and she was getting more anxious at this point. 12 \"F\" words in a minute. Then did beautifully the rest of the test with 2/2 abstraction, 5/5 delayed recall (I was happily surprised especially given how she was so anxious early on in the test) and 6/6 orientation.    ASSESSMENT/PLAN:       Anxiety & Cognitive impairment   Try to maximize anxiety therapy with increased dose of Lexapro. Though she is quite anxious today and overwhelmed/tearful at times, family has reported that they have seen a clear improvement in her disposition since starting the Lexapro 5 mg dose. Will increase to 10 mg and even could eventually increase to 20 mg dose.  Has strong, supportive family. Encouraged further investigation of cognitive changes with occupational therapy and speech therapy (surprisingly had difficulty in language portions of MoCA though conversation seems good) and they are willing to follow through on this.  Is working with ENT on hearing though she seemed to understand me quite well in casual conversation today.  I'm concerned though that this is early dementia given the changes noted (and " it was especially striking her difficulty with hand placement on the clock)  She [place]  and we discussed that will be slightly a test - see me before/after that  - escitalopram (LEXAPRO) 10 MG tablet; Take 1 tablet (10 mg) by mouth daily  Dispense: 90 tablet; Refill: 1  - OCCUPATIONAL THERAPY REFERRAL  - SPEECH THERAPY REFERRAL    Patient Instructions   Increase Lexapro to 10 mg daily    Schedule occupational therapy and speech therapy and follow up with me in the springtime    MDM: >60 minutes spent with patient/family, over 50% time counseling, coordinating care and explaining about nature of the patient's conditions.    Teagan Tang, [facility]

## 2018-01-09 NOTE — MR AVS SNAPSHOT
"              After Visit Summary   1/9/2018    Afia Sylvester    MRN: [id number]           Patient Information     Date Of Birth          1945        Visit Information        Provider Department      1/9/2018 1:30 PM Teagan Tang, DO [facility]        Today's Diagnoses     Cognitive changes    -  1    Anxiety          Care Instructions    Increase Lexapro to 10 mg daily    Schedule occupational therapy and speech therapy and follow up with me in the springtime          Follow-ups after your visit        Additional Services     OCCUPATIONAL THERAPY REFERRAL       *This therapy referral will be filtered to a centralized scheduling office at [facility] and the patient will receive a call to schedule an appointment at a [place] location most convenient for them. *     [facility] provides Occupational Therapy evaluation and treatment and many specialty services across the [place] system.  If requesting a specialty program, please choose from the list below.    If you have not heard from the scheduling office within 2 business days, please call [phone] for all locations, with the exception of [place], please call [phone].     Treatment: Evaluation & Treatment  Special Instructions/Modalities: CPT  Special Programs: Cognition Assessment     Please be aware that coverage of these services is subject to the terms and limitations of your health insurance plan.  Call member services at your health plan with any benefit or coverage questions.      **Note to Provider:  If you are referring outside of [place] for the therapy appointment, please list the name of the location in the \"special instructions\" above, print the referral and give to the patient to schedule the appointment.            SPEECH THERAPY REFERRAL       *This therapy referral will be filtered to a centralized scheduling office at [facility] and the patient will " "receive a call to schedule an appointment at a [place] location most convenient for them. *     [facility] provides Speech Therapy evaluation and treatment and many specialty services across the [place] system.  If requesting a specialty program, please choose from the list below.  If you have not heard from the scheduling office within 2 business days, please call [phone] for all locations, with the exception of [place], please call [phone].       Treatment: Evaluation & Treatment  Speech Treatment Diagnosis: Cognitive Deficits  Special Instructions: Focus on language  Special Programs: None    Please be aware that coverage of these services is subject to the terms and limitations of your health insurance plan.  Call member services at your health plan with any benefit or coverage questions.      **Note to Provider:  If you are referring outside of [place] for the therapy appointment, please list the name of the location in the \"special instructions\" above, print the referral and give to the patient to schedule the appointment.                  Who to contact     If you have questions or need follow up information about today's clinic visit or your schedule please contact [facility] [place] directly at [phone].  Normal or non-critical lab and imaging results will be communicated to you by Manymoonhart, letter or phone within 4 business days after the clinic has received the results. If you do not hear from us within 7 days, please contact the clinic through Manymoonhart or phone. If you have a critical or abnormal lab result, we will notify you by phone as soon as possible.  Submit refill requests through Unified or call your pharmacy and they will forward the refill request to us. Please allow 3 business days for your refill to be completed.          Additional Information About Your Visit        Unified Information     Unified gives you secure access to your electronic health " "record. If you see a primary care provider, you can also send messages to your care team and make appointments. If you have questions, please call your primary care clinic.  If you do not have a primary care provider, please call [phone] and they will assist you.        Care EveryWhere ID     This is your Care EveryWhere ID. This could be used by other organizations to access your [place] medical records  [id number]        Your Vitals Were     Pulse Temperature Height Last Period Pulse Oximetry BMI (Body Mass Index)    83 97.3  F (36.3  C) (Oral) 5' 3.5\" (1.613 m) (LMP Unknown) 100% 20.05 kg/m2       Blood Pressure from Last 3 Encounters:   01/09/18 120/64   09/11/17 136/77   03/31/17 110/60    Weight from Last 3 Encounters:   01/09/18 115 lb (52.2 kg)   09/11/17 118 lb 3.2 oz (53.6 kg)   03/31/17 120 lb (54.4 kg)              We Performed the Following     OCCUPATIONAL THERAPY REFERRAL     SPEECH THERAPY REFERRAL          Today's Medication Changes          These changes are accurate as of: 1/9/18  2:35 PM.  If you have any questions, ask your nurse or doctor.               These medicines have changed or have updated prescriptions.        Dose/Directions    escitalopram 10 MG tablet   Commonly known as:  LEXAPRO   This may have changed:  See the new instructions.   Used for:  Anxiety   Changed by:  Teagan Tang DO        Dose:  10 mg   Take 1 tablet (10 mg) by mouth daily   Quantity:  90 tablet   Refills:  1            Where to get your medicines      These medications were sent to [facility] [address] [place] [address] AT [address] [address]     Phone:  [phone]     escitalopram 10 MG tablet                Primary Care Provider Office Phone # Fax #    Teagan Tang [phone] [address] [facility] [address] [facility] [address]  [place] [address]        Equal Access to Services     ROCCO MELGOZA AH: Cate pinzon " Yulisa, donato armendarizcresencio, amandata kapeggy alarcon, janneth rose marymargarita gross luperaheem laKeylaevonne jonny. So [facility] [phone].    ATENCIÓN: Si habla cordell, tiene a rose disposición servicios gratuitos de asistencia lingüística. Lino al [phone].    We comply with applicable federal civil rights laws and [place] laws. We do not discriminate on the basis of race, color, national origin, age, disability, sex, sexual orientation, or gender identity.            Thank you!     Thank you for choosing [facility]  for your care. Our goal is always to provide you with excellent care. Hearing back from our patients is one way we can continue to improve our services. Please take a few minutes to complete the written survey that you may receive in the mail after your visit with us. Thank you!             Your Updated Medication List - Protect others around you: Learn how to safely use, store and throw away your medicines at www.disposemymeds.org.          This list is accurate as of: 1/9/18  2:35 PM.  Always use your most recent med list.                   Brand Name Dispense Instructions for use Diagnosis    CALCIUM 600 PO           escitalopram 10 MG tablet    LEXAPRO    90 tablet    Take 1 tablet (10 mg) by mouth daily    Anxiety       vitamin D 2000 UNITS tablet      Take 1 tablet by mouth daily

## 2018-01-10 ASSESSMENT — ANXIETY QUESTIONNAIRES: GAD7 TOTAL SCORE: 3

## 2018-01-12 ENCOUNTER — TRANSFERRED RECORDS (OUTPATIENT)
Dept: HEALTH INFORMATION MANAGEMENT | Facility: CLINIC | Age: 73
End: 2018-01-12

## 2018-03-06 ENCOUNTER — HOSPITAL ENCOUNTER (OUTPATIENT)
Dept: OCCUPATIONAL THERAPY | Facility: CLINIC | Age: 73
Setting detail: THERAPIES SERIES
End: 2018-03-06
Attending: INTERNAL MEDICINE
Payer: MEDICARE

## 2018-03-06 PROCEDURE — G9169 MEMORY GOAL STATUS: HCPCS | Mod: GO,CJ | Performed by: REHABILITATION PRACTITIONER

## 2018-03-06 PROCEDURE — 97535 SELF CARE MNGMENT TRAINING: CPT | Mod: GO | Performed by: REHABILITATION PRACTITIONER

## 2018-03-06 PROCEDURE — G9168 MEMORY CURRENT STATUS: HCPCS | Mod: GO,CJ | Performed by: REHABILITATION PRACTITIONER

## 2018-03-06 PROCEDURE — 40000242 ZZH STATISTIC VISIT MEMORY CLINIC: Performed by: REHABILITATION PRACTITIONER

## 2018-03-06 PROCEDURE — 96125 COGNITIVE TEST BY HC PRO: CPT | Mod: GO | Performed by: REHABILITATION PRACTITIONER

## 2018-03-06 PROCEDURE — 97165 OT EVAL LOW COMPLEX 30 MIN: CPT | Mod: GO | Performed by: REHABILITATION PRACTITIONER

## 2018-03-06 NOTE — PROGRESS NOTES
[facility]          OUTPATIENT OCCUPATIONAL THERAPY  EVALUATION  PLAN OF TREATMENT FOR OUTPATIENT REHABILITATION  (COMPLETE FOR INITIAL CLAIMS ONLY)  Patient's Last Name, First Name, M.I.  YOB: 1945  Afia Sylvester                        Provider's Name  [facility] Medical Record No.  [id number]                               Onset Date:     01/09/18   Start of Care Date:     03/06/18   Type:     ___PT   _X_OT   ___SLP Medical Diagnosis:     Cognitive changes, anxiety                          OT Diagnosis:     Impaired memory impacting ADL/IADL I Visits from SOC:  1   _________________________________________________________________________________  Plan of Treatment/Functional Goals:  ADL training, IADL training, Cognitive performance testing     Pt and  report they are hoping to get some objective information from todays visit.   concerned about dementia              Goals  Goal Identifier: 1 CPT  Goal Description: Pt will participate in CPT and pt/family will verbalize understanding of results and recommendations.  Target Date: 04/03/18     Goal Identifier: 2 Memory compensation strategies  Goal Description: Pt will identify 2 memory compensation strategies and use 7/7 days to improve recall for ADL/IADL's and conversations.  Target Date: 04/03/18     Goal Identifier: 3 Community reintegration  Goal Description: Pt will score within normal ranges in multiple subtests correlated with community reintegration including crossing street, running an errand and navigation.    Target Date: 04/03/18          Therapy Frequency: 1x/week      Predicted Duration of Therapy Intervention (days/wks): 4 weeks  Karen Juarez OT          I CERTIFY THE NEED FOR THESE SERVICES FURNISHED UNDER        THIS PLAN OF TREATMENT AND WHILE UNDER MY CARE     (Physician co-signature of  this document indicates review and certification of the therapy plan).                            Referring Physician: Dr. Teagan Tang     Initial Assessment        See Epic Evaluation      Start Of Care Date: 03/06/18

## 2018-03-06 NOTE — PROGRESS NOTES
"   03/06/18 0900   Quick Adds   Type of Visit Initial Outpatient Occupational Therapy Evaluation       Present No   General Information   Start Of Care Date 03/06/18   Referring Physician Dr. Teagan Tang   Orders Evaluate and treat as indicated   Other Orders SLP   Orders Date 01/09/18   Medical Diagnosis Cognitive changes, anxiety   Onset of Illness/Injury or Date of Surgery 01/09/18   Precautions/Limitations No known precautions/limitations   Special Instructions CPT, cognitive assessment   Surgical/Medical History Reviewed Yes   Additional Occupational Profile Info/Pertinent History of Current Problem Pt and  present for evaluation.   concerned about recall and asking same questions without recognizing she is repeating herself.     Role/Living Environment   Current Community Support Family/friend caregiver   Patient role/Employment history Retired   Community/Avocational Activities Pt is a retired nurse, Pt has 3 daughters (2 are local) and has 5 grandkids.  Pt is working on organizing paperwork, Pt enjoys walking.     Current Living Environment House   Prior Level - Transfers Independent   Prior Level - Ambulation Independent   Prior Level - ADLS Independent   Prior Responsibilities - IADL Meal Preparation;Laundry;Housekeeping;Shopping;Yardwork;Medication management;Finances;Driving;   Prior Level Comments Pt cares for grandkids at times.     Patient/family Goals Statement \"To know how bad my memory is.\"   Pain   Patient currently in pain No   Fall Risk Screen   Fall screen completed by OT   Have you fallen 2 or more times in the past year? No   Have you fallen and had an injury in the past year? No   Cognitive Status Examination   Level of Consciousness Alert   Follows Commands and Answers Questions 100% of the time   Personal Safety and Judgment Intact   Memory Impaired   Memory Comments Impaired recall of conversations   Executive Function Planning ability " impaired   Cognitive Comment Pt reports she tends to save things, handles her papers often.    Visual Perception   Visual Perception No deficits were identified   Activity Tolerance   Activity Tolerance Pt reports balance, strength and good endurance level.  Pt is independent with basic ADL I.   Instrumental Activities of Daily Living Assessment   IADL Assessment/Observations Pt is currently driving, does not drive on highway with grandkids, limits driving at night, drives with  at night, prefers side roads.     Planned Therapy Interventions   Planned Therapy Interventions ADL training;IADL training;Cognitive performance testing   Intervention Comments Pt and  report they are hoping to get some objective information from todays visit.   concerned about dementia   Adult OT Eval Goals   OT Eval Goals (Adult) 1;2   OT Goal 1   Goal Identifier 1 CPT   Goal Description Pt will participate in CPT and pt/family will verbalize understanding of results and recommendations.   Target Date 04/03/18   OT Goal 2   Goal Identifier 2 Memory compensation strategies   Goal Description Pt will identify 2 memory compensation strategies and use 7/7 days to improve recall for ADL/IADL's and conversations.   Target Date 04/03/18   Clinical Impression   Criteria for Skilled Therapeutic Interventions Met Yes, treatment indicated   OT Diagnosis Impaired memory impacting ADL/IADL I   Influenced by the following impairments Memory and anxiety   Assessment of Occupational Performance 1-3 Performance Deficits   Identified Performance Deficits Recall for ADL's and managing complex IADL tasks   Clinical Decision Making (Complexity) Low complexity   Therapy Frequency 1x/week    Predicted Duration of Therapy Intervention (days/wks) 4 weeks   Risks and Benefits of Treatment have been explained. Yes   Patient, Family & other staff in agreement with plan of care Yes   Education Assessment   Barriers To Learning Emotional;Cognitive    Preferred Learning Style Reading;Demonstration   Total Evaluation Time   Total Evaluation Time 14

## 2018-03-06 NOTE — PROGRESS NOTES
"Cognitive Performance Test    SUMMARY OF TEST:    The Cognitive Performance Test (CPT) is a standardized performance-based assessment to measure working memory/executive function processing capacities that underlie functional performance. Subtasks include common basic and instrumental activities of daily living (ADL/IADL) which are rated based on the manner in which patients respond to task demands of varying complexity. The total CPT score describes a level of functioning that indicates how information is processed, implications for functional activities, potential safety risks and a recommended level of supervision or assist based on cognitive function. The highest total score on this test is in the range of 5.6 to 5.8.    DATE OF TESTING: 3/6/18    RESULTS OF TESTING:                                                                                         CPT Subtest Results    MEDBOX: 4.5/6 SHOP/GLOVES: 5/6 PHONE: 5/6   WASH:  5/5 TRAVEL: 4/6 TOAST: 5/5   DRESS: 5/5   TOTAL CPT SCORE:  33.5/39     Average CPT Score  4.8/5.6    INTERPRETATION OF TEST RESULTS:    Based on the Cognitive Performance Test, this patient scored at CPT Level 4.5.  See CPT Levels reference below.    Summary of functional cognitive status:   Individuals at this level can typically live alone with daily checks for safety.  Afia was cooperative and compliant during the CPT.  She did become frustrated with some of the tasks and reported \"I feel so stupid, I know how to count coins.\"  Despite her frustration, she was able to focus on the next task and maintain a stable emotional status.  Afia demonstrated ability to listen to full instructions before starting the task and tends to work quickly.  Errors were made in recalling and attending to details of the task (ie, pt thought that medications were for 2 weeks since there were 2 pill boxes despite labeling for am and pm) and difficulty with counting coins and recounted multiple times.  " "Pt was observed perseverating on a task and then starting over as a problem solving strategy.      Factors affecting performance:  Impaired hearing  New or complex medical issue    Recommendations:    Assist for ADL/IADL:  Managing paperwork and organization  Supervision for ADL/IADL:  Finances, Driving to unfamiliar areas and Medication management of new medications  Supervision in living setting:  Daily checks         Other Recommendations:  Recommend establishing a predictable daily routine  Write out plan for her day  20 minute of physical exercise  Engage in social interaction  Read paper or listen to talk radio and discuss topic with   Use a timer when cooking  Engage in brain games (cross words, word finds, puzzles)  Organize paper work in \"toss, keep, read later\" piles  Use memory journal          Recommend use of check card vs paying for items in cash and oversight by  on financial transactions                                    TIME ADMINISTERING TEST: 36    TIME FOR INTERPRETATION AND PREPARATION OF REPORT: 10    TOTAL TIME: 46      CPT Levels Reference:    Patient's Average CPT Score:  4.8                                                                                                                                                  Individual scores range along a continuum as outlined below.  In addition to cognitive status, other factors may affect safety in a home environment.  Please refer to specific recommendations for this patient.    ___5.6-5.8  Normal functioning (absence of cognitive-functional disability).  Independent in managing personal affairs, monitors and directs own behavior.  Uses complex information to carry out daily activities with safety and accuracy.    Proficient with instrumental activities of daily living (IADL) and learning new activity.  Problems are anticipated, errors are avoided, and consequences of actions are considered.      _XX__5.0   Mild " "cognitive-functional disability; deficits in working memory and executive thought processes. Difficulty using complex information. Problems may be observed with recent memory, judgment, reasoning and planning ahead. May be impulsive or have difficulty anticipating consequences.  Safety:  May require assistance to plan ahead; or to manage complex medication schedules, appointments or finances.  Hazardous activities may need to be monitored or limited.  ADL:  Mild functional decline.  Able to complete basic self-care and routine household tasks.  May have difficulty with complex daily tasks such as reading, writing, meal preparation, shopping or driving.   Learns through hands on teaching. Self-centered behavior or difficulty considering the needs of others may be seen related to trouble seeing the  whole picture\". Can appear disorganized or uninhibited.    _XX__4.5  Mild to moderate cognitive-functional disability. Significant deficits in working memory and executive thought processes. Judgment, reasoning and planning show obvious impairment.  Distractible with inability to shift attention/actions given competing stimuli.  Difficulty with problem solving and managing details. Complex daily tasks performed with inconsistency, difficulty, or error.     Safety:  Medications should be monitored, stove use may require supervision, and driving ability may be affected.  Impaired safety awareness with inability to anticipate potential problems.  May not recognize or respond to emergent situations. Requires frequent check-in support.   ADL:  Mild difficulty with simple everyday self-care tasks. Benefits from structured, routine activity.  Will likely need reminders to complete tasks outside of the routine. Requires assistance with planning and IADL tasks like shopping and finances. Learns concrete tasks through repetition, but performance may not generalize. Tends to be impulsive with poor insight. Self centered behavior or " inability to consider the needs of others is common.    ___4.0  Moderate cognitive-functional disability; abstract to concrete thought processes. Working memory and executive function impairments are obvious. Difficulty with planning and problem solving.  Behavior is goal-directed, but unable to follow multi-step directions, is easily distracted, and may not recognize mistakes.  Inability to anticipate hazards or understand precautions.  Safety:  Recommend 24-hour supervision for safety. Supervision needed for medication management and for hazardous activities. May not be able to follow a restricted diet. Can get lost in unfamiliar surroundings. Generally, persons functioning at level 4 should not be driving.   ADL:  Some decline in quality or frequency of ADL.  [place] enhanced by use of a routine, simple concrete directions, and caregiver set-up of needed items. Complex tasks such as money or home management typically requires assistance.  Relies heavily on vision to guide behavior; will ignore objects/hazards not in plain sight and can be distracted by irrelevant objects. Often has poor insight.  Able to carry out social conversation and may verbally  cover  for deficits leading caregivers to believe they are capable of functioning independently.       ___3.5  Moderate cognitive-functional disability; increased cues needed for task completion. Aware of concrete task steps but needs prompting or cues to initiate and complete simple tasks. Attention span is limited, simple directions may need to be repeated, and re-focus to a topic or task may be required.  Safety:  24-hour supervision required for safety and for assistance with daily tasks. Assistance required with medications, and access to medication should be limited. Meals, nutrition and dietary restrictions need to be monitored.  All hazardous activities should be restricted or supervised. Should not drive. Prone to wandering and can become lost.  ADL:   Moderate functional decline. Familiar tasks usually requires set-up of supplies and directions to complete steps. May need objects handed to them for task initiation. Function best with a set schedule in familiar surroundings with familiar people. All complex tasks must be done by others. Vocabulary is diminished and speech often unfocused.

## 2018-03-20 ENCOUNTER — HOSPITAL ENCOUNTER (OUTPATIENT)
Dept: OCCUPATIONAL THERAPY | Facility: CLINIC | Age: 73
Setting detail: THERAPIES SERIES
End: 2018-03-20
Attending: INTERNAL MEDICINE
Payer: MEDICARE

## 2018-03-20 PROCEDURE — 40000242 ZZH STATISTIC VISIT MEMORY CLINIC: Performed by: REHABILITATION PRACTITIONER

## 2018-03-20 PROCEDURE — 97535 SELF CARE MNGMENT TRAINING: CPT | Mod: GO | Performed by: REHABILITATION PRACTITIONER

## 2018-05-30 NOTE — PROGRESS NOTES
Outpatient Occupational Therapy Discharge Note     Patient: Afia Sylvester  : 1945    Beginning/End Dates of Reporting Period:  3/6/18 to 3/20/2018    Referring Provider: Dr. Teagan Tang    Therapy Diagnosis: Impaired ADL/IADL I      Goals:     Goal Identifier 1 CPT   Goal Description Pt will participate in CPT and pt/family will verbalize understanding of results and recommendations.   Target Date 18   Date Met  18   Progress:     Goal Identifier 2 Memory compensation strategies   Goal Description Pt will identify 2 memory compensation strategies and use 7/7 days to improve recall for ADL/IADL's and conversations.   Target Date 18   Date Met  18   Progress:     Goal Identifier 3 Community reintegration   Goal Description Pt will score within normal ranges in multiple subtests correlated with community reintegration including crossing street, running an errand and navigation.     Target Date 18   Date Met      Progress:       Progress Toward Goals:   Progress this reporting period: Goals 1 and 2 addressed and met.  Goal 3 not addressed.    Plan:  Discharge from therapy.    Discharge:    Reason for Discharge: Patient has failed to schedule further appointments.      Discharge Plan: Continue with use of memory compensation strategies.

## 2018-08-10 DIAGNOSIS — F41.9 ANXIETY: ICD-10-CM

## 2018-08-10 NOTE — TELEPHONE ENCOUNTER
"Last Written Prescription Date:  1/09/18  Last Fill Quantity: 90 tablet,  # refills: 1   Last office visit: 1/9/2018 with prescribing provider:  Clyde   Future Office Visit:   Next 5 appointments (look out 90 days)     Oct 03, 2018  2:00 PM CDT   [facility]    [address] [facility] [id number] [phone]                 Requested Prescriptions   Pending Prescriptions Disp Refills     escitalopram (LEXAPRO) 10 MG tablet 90 tablet 1     Sig: Take 1 tablet (10 mg) by mouth daily    SSRIs Protocol Passed    8/10/2018  9:08 AM       Passed - Recent (12 mo) or future (30 days) visit within the authorizing provider's specialty    Patient had office visit in the last 12 months or has a visit in the next 30 days with authorizing provider or within the authorizing provider's specialty.  See \"Patient Info\" tab in inbasket, or \"Choose Columns\" in Meds & Orders section of the refill encounter.           Passed - Patient is age 18 or older       Passed - No active pregnancy on record       Passed - No positive pregnancy test in last 12 months          No flowsheet data found.     ALESSANDRO-7 SCORE 1/9/2018   Total Score 3         "

## 2018-08-13 RX ORDER — ESCITALOPRAM OXALATE 10 MG/1
10 TABLET ORAL DAILY
Qty: 90 TABLET | Refills: 0 | Status: SHIPPED | OUTPATIENT
Start: 2018-08-13 | End: 2018-08-20

## 2018-08-13 NOTE — TELEPHONE ENCOUNTER
Prescription approved per [facility] Refill Protocol.    Kristen ANGULO RN

## 2018-08-17 DIAGNOSIS — F41.9 ANXIETY: ICD-10-CM

## 2018-08-17 NOTE — TELEPHONE ENCOUNTER
"Escitalopram 10 mg    Last Written Prescription Date:  08/13/18  Last Fill Quantity: 90 tablets,  # refills: 0   Last office visit: 1/9/2018 with prescribing provider:  Clyde   Future Office Visit:   Next 5 appointments (look out 90 days)     Oct 03, 2018  2:00 PM CDT   Office Visit with Teagan Tang,    [facility] ([facility])    [address] [address] [phone]                 Requested Prescriptions   Pending Prescriptions Disp Refills     escitalopram (LEXAPRO) 10 MG tablet [Pharmacy Med Name: ESCITALOPRAM OXALATE 10 MG Tablet] 90 tablet 0     Sig: TAKE 1 TABLET EVERY DAY    SSRIs Protocol Passed    8/17/2018  1:59 PM       Passed - Recent (12 mo) or future (30 days) visit within the authorizing provider's specialty    Patient had office visit in the last 12 months or has a visit in the next 30 days with authorizing provider or within the authorizing provider's specialty.  See \"Patient Info\" tab in inbasket, or \"Choose Columns\" in Meds & Orders section of the refill encounter.           Passed - Patient is age 18 or older       Passed - No active pregnancy on record       Passed - No positive pregnancy test in last 12 months          "

## 2018-08-20 RX ORDER — ESCITALOPRAM OXALATE 10 MG/1
TABLET ORAL
Qty: 90 TABLET | Refills: 0 | OUTPATIENT
Start: 2018-08-20

## 2018-08-20 RX ORDER — ESCITALOPRAM OXALATE 10 MG/1
10 TABLET ORAL DAILY
Qty: 90 TABLET | Refills: 0 | Status: SHIPPED | OUTPATIENT
Start: 2018-08-20 | End: 2018-10-03

## 2018-10-03 ENCOUNTER — OFFICE VISIT (OUTPATIENT)
Dept: FAMILY MEDICINE | Facility: CLINIC | Age: 73
End: 2018-10-03
Payer: COMMERCIAL

## 2018-10-03 VITALS
OXYGEN SATURATION: 99 % | HEIGHT: 64 IN | TEMPERATURE: 98 F | SYSTOLIC BLOOD PRESSURE: 129 MMHG | BODY MASS INDEX: 19.81 KG/M2 | WEIGHT: 116 LBS | HEART RATE: 67 BPM | DIASTOLIC BLOOD PRESSURE: 67 MMHG

## 2018-10-03 DIAGNOSIS — F03.90 DEMENTIA WITHOUT BEHAVIORAL DISTURBANCE, UNSPECIFIED DEMENTIA TYPE: Primary | ICD-10-CM

## 2018-10-03 DIAGNOSIS — Z23 NEED FOR PROPHYLACTIC VACCINATION AND INOCULATION AGAINST INFLUENZA: ICD-10-CM

## 2018-10-03 DIAGNOSIS — F41.9 ANXIETY: ICD-10-CM

## 2018-10-03 PROBLEM — Z78.9 NONSMOKER: Status: RESOLVED | Noted: 2017-03-31 | Resolved: 2018-10-03

## 2018-10-03 PROBLEM — R41.89 COGNITIVE CHANGES: Status: RESOLVED | Noted: 2018-01-09 | Resolved: 2018-10-03

## 2018-10-03 PROCEDURE — 90662 IIV NO PRSV INCREASED AG IM: CPT | Performed by: INTERNAL MEDICINE

## 2018-10-03 PROCEDURE — G0008 ADMIN INFLUENZA VIRUS VAC: HCPCS | Performed by: INTERNAL MEDICINE

## 2018-10-03 PROCEDURE — 99215 OFFICE O/P EST HI 40 MIN: CPT | Mod: 25 | Performed by: INTERNAL MEDICINE

## 2018-10-03 RX ORDER — DONEPEZIL HYDROCHLORIDE 5 MG/1
5 TABLET, FILM COATED ORAL DAILY
Qty: 90 TABLET | Refills: 3 | Status: SHIPPED | OUTPATIENT
Start: 2018-10-03 | End: 2019-04-03

## 2018-10-03 RX ORDER — ESCITALOPRAM OXALATE 10 MG/1
10 TABLET ORAL DAILY
Qty: 90 TABLET | Refills: 3 | Status: SHIPPED | OUTPATIENT
Start: 2018-10-03 | End: 2019-04-03

## 2018-10-03 NOTE — PATIENT INSTRUCTIONS
Continue Lexapro    Add Donepezil (Aricept) daily for early dementia and keep your mind stimulated as you are doing so well with this right now    Donepezil (Aricept) common side effects include: nausea, loss of appetite with weight loss, diarrhea, dizziness and nightmares    Flu shot today    Schedule future colonoscopy and mammogram : [facility]: [phone] and [facility]: [phone] in [address] downstairs    See me in the springtime

## 2018-10-03 NOTE — PROGRESS NOTES
"  SUBJECTIVE:   Afia Sylvester is a 73 year old female who presents to clinic today for the following health issues:      Afia is here with  Sukhjinder in follow up of her cognitive impairment and anxiety. Occupational therapy CPT was 4.8/5.6 in March 2018 with struggles involving some repetition/perseveration of tasks until she figured it out though getting frustrated some along the way. Recall MoCA 21/30 in Jan 2018.    Did visit family in [place] in the spring and then family came to visit them over the summer which was exciting for Afia.    The daily routine is going well and Afia feels she hasn't had any changes. However,  Sukhjinder openly voices his concern for early dementia. She got lost once driving that we discussed last visit (so not lost again). Doing a great job of going through their records as they slowly are gearing up for down-sizing.  says \"definitely short term memory loss and some cognitive impairment\"; he is getting more questions from her even if they have already discussed it prior. He says \"planning and new learning are struggles\" for her; ex: voicemail on new cell phone even after going through writing out step-by-step instructions. She eats well and walks a lot. He is slowly taking over the food prep b/c he enjoys it and she doesn't as much anymore. They've discussed \"a memory pill\". She is open to discussing this. Is compliant with Lexapro.     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    Hearing aides now have been helpful.    Ovaries and uterus removed for endometrial cancer in 1998; didn't need any other treatment besides surgery according to Sukhjinder.    Due for colonoscopy in Nov through [facility] which she will schedule and will consider mammogram too.      Problem list and histories reviewed & adjusted, as indicated.    ROS:  Detailed as above " "    OBJECTIVE:     /67 (BP Location: Right arm, Patient Position: Sitting, Cuff Size: Adult Regular)  Pulse 67  Temp 98  F (36.7  C) (Oral)  Ht 5' 3.5\" (1.613 m)  Wt 116 lb (52.6 kg)  LMP  (LMP Unknown)  SpO2 99%  Breastfeeding? No  BMI 20.23 kg/m2  Body mass index is 20.23 kg/(m^2).  Wearing hearing aides  Alert, pleasant, NAD, nicely groomed  Tangential at times but able to carry on a nice conversation  Not as emotional today    Wt Readings from Last 4 Encounters:   10/03/18 116 lb (52.6 kg)   01/09/18 115 lb (52.2 kg)   09/11/17 118 lb 3.2 oz (53.6 kg)   03/31/17 120 lb (54.4 kg)       ASSESSMENT/PLAN:       1. 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 She has great support and is remaining active mentally/physically; partners together with  on activities. See CPT and MoCA scores above from earlier this year. She is in agreement with trial Donepezil to see if she has a positive response - monitor for s/e which were discussed today. Follow up in spring 2019.  - donepezil (ARICEPT) 5 MG tablet; Take 1 tablet (5 mg) by mouth daily  Dispense: 90 tablet; Refill: 3    2. Anxiety  Both agree Lexapro has been helpful  - escitalopram (LEXAPRO) 10 MG tablet; Take 1 tablet (10 mg) by mouth daily  Dispense: 90 tablet; Refill: 3    3. Need for prophylactic vaccination and inoculation against influenza  - FLU VACCINE, INCREASED ANTIGEN, PRESV FREE, AGE 65+ [14328]  - Vaccine Administration, Initial [76972]  - ADMIN INFLUENZA (For MEDICARE Patients ONLY) []    Patient Instructions   Continue Lexapro    Add Donepezil (Aricept) daily for early dementia and keep your mind stimulated as you are doing so well with this right now    Donepezil (Aricept) common side effects include: nausea, loss of appetite with weight loss, diarrhea, dizziness and " nightmares    Flu shot today    Schedule future colonoscopy and mammogram : [facility]: [phone] and [facility]: [phone] in [address] downstairs    See me in the springtime      MDM: >45 minutes spent with patient/,  over 50% time counseling, coordinating care and explaining about nature of the patient's conditions.    Teagan Tang,   [facility]

## 2018-10-03 NOTE — PROGRESS NOTES
Injectable Influenza Immunization Documentation    1.  Is the person to be vaccinated sick today?   No    2. Does the person to be vaccinated have an allergy to a component   of the vaccine?   No  Egg Allergy Algorithm Link    3. Has the person to be vaccinated ever had a serious reaction   to influenza vaccine in the past?   No    4. Has the person to be vaccinated ever had Guillain-Barré syndrome?   No    Form completed by Cheyenne Russ CMA  Prior to injection verified patient identity using patient's name and date of birth.  Due to injection administration, patient instructed to remain in clinic for 15 minutes  afterwards, and to report any adverse reaction to me immediately.

## 2018-10-03 NOTE — MR AVS SNAPSHOT
After Visit Summary   10/3/2018    Afia Sylvester    MRN: [id number]           Patient Information     Date Of Birth          1945        Visit Information        Provider Department      10/3/2018 2:00 PM Teagan Tang, DO [facility]        Today's Diagnoses     Anxiety    -  1    Dementia without behavioral disturbance, unspecified dementia type          Care Instructions    Continue Lexapro    Add Donepezil (Aricept) daily for early dementia and keep your mind stimulated as you are doing so well with this right now    Donepezil (Aricept) common side effects include: nausea, loss of appetite with weight loss, diarrhea, dizziness and nightmares    Flu shot today    Schedule future colonoscopy and mammogram : [facility]: [phone] and [facility]: [phone] in [address] downstairs    See me in the springtime            Follow-ups after your visit        Who to contact     If you have questions or need follow up information about today's clinic visit or your schedule please contact [facility] directly at [phone].  Normal or non-critical lab and imaging results will be communicated to you by Synchrohart, letter or phone within 4 business days after the clinic has received the results. If you do not hear from us within 7 days, please contact the clinic through [employer] or phone. If you have a critical or abnormal lab result, we will notify you by phone as soon as possible.  Submit refill requests through Red-rabbit or call your pharmacy and they will forward the refill request to us. Please allow 3 business days for your refill to be completed.          Additional Information About Your Visit        Synchrohart Information     Red-rabbit gives you secure access to your electronic health record. If you see a primary care provider, you can also send messages to your care team and make appointments. If you have questions, please call your  "primary care clinic.  If you do not have a primary care provider, please call [phone] and they will assist you.        Care EveryWhere ID     This is your Care EveryWhere ID. This could be used by other organizations to access your [place] medical records  [id number]        Your Vitals Were     Pulse Temperature Height Last Period Pulse Oximetry Breastfeeding?    67 98  F (36.7  C) (Oral) 5' 3.5\" (1.613 m) (LMP Unknown) 99% No    BMI (Body Mass Index)                   20.23 kg/m2            Blood Pressure from Last 3 Encounters:   10/03/18 129/67   01/09/18 120/64   09/11/17 136/77    Weight from Last 3 Encounters:   10/03/18 116 lb (52.6 kg)   01/09/18 115 lb (52.2 kg)   09/11/17 118 lb 3.2 oz (53.6 kg)              Today, you had the following     No orders found for display         Today's Medication Changes          These changes are accurate as of 10/3/18  2:54 PM.  If you have any questions, ask your nurse or doctor.               Start taking these medicines.        Dose/Directions    donepezil 5 MG tablet   Commonly known as:  ARICEPT   Used for:  Dementia without behavioral disturbance, unspecified dementia type   Started by:  Teagan Tang DO        Dose:  5 mg   Take 1 tablet (5 mg) by mouth daily   Quantity:  90 tablet   Refills:  3            Where to get your medicines      These medications were sent to [facility] - [facility] [address]  [address], [facility] [address]     Phone:  [phone]     donepezil 5 MG tablet    escitalopram 10 MG tablet                Primary Care Provider Office Phone # Fax #    Teagan Tang [phone] [address]  [place] [address]        Equal Access to Services     ROCCO MELGOZA AH: Hadii vanda ku hadasho Soomaali, waaxda luqadaha, qaybta kaalmada adeegyada, waxay lise fuller. So [facility] [phone].    ATENCIÓN: Si habla español, tiene a rose disposición " servicios gratuitos de asistencia lingüística. Lino mcclain [phone].    We comply with applicable federal civil rights laws and [place] laws. We do not discriminate on the basis of race, color, national origin, age, disability, sex, sexual orientation, or gender identity.            Thank you!     Thank you for choosing [facility]  for your care. Our goal is always to provide you with excellent care. Hearing back from our patients is one way we can continue to improve our services. Please take a few minutes to complete the written survey that you may receive in the mail after your visit with us. Thank you!             Your Updated Medication List - Protect others around you: Learn how to safely use, store and throw away your medicines at www.disposemymeds.org.          This list is accurate as of 10/3/18  2:54 PM.  Always use your most recent med list.                   Brand Name Dispense Instructions for use Diagnosis    CALCIUM 600 PO           donepezil 5 MG tablet    ARICEPT    90 tablet    Take 1 tablet (5 mg) by mouth daily    Dementia without behavioral disturbance, unspecified dementia type       escitalopram 10 MG tablet    LEXAPRO    90 tablet    Take 1 tablet (10 mg) by mouth daily    Anxiety       vitamin D 2000 units tablet      Take 1 tablet by mouth daily

## 2019-04-03 ENCOUNTER — OFFICE VISIT (OUTPATIENT)
Dept: FAMILY MEDICINE | Facility: CLINIC | Age: 74
End: 2019-04-03
Payer: COMMERCIAL

## 2019-04-03 VITALS
HEIGHT: 63 IN | BODY MASS INDEX: 21.25 KG/M2 | TEMPERATURE: 99.3 F | HEART RATE: 68 BPM | OXYGEN SATURATION: 98 % | DIASTOLIC BLOOD PRESSURE: 76 MMHG | SYSTOLIC BLOOD PRESSURE: 121 MMHG | WEIGHT: 119.9 LBS

## 2019-04-03 DIAGNOSIS — F41.9 ANXIETY: ICD-10-CM

## 2019-04-03 DIAGNOSIS — F03.90 DEMENTIA WITHOUT BEHAVIORAL DISTURBANCE, UNSPECIFIED DEMENTIA TYPE: Primary | ICD-10-CM

## 2019-04-03 PROBLEM — R41.3 MEMORY CHANGES: Status: RESOLVED | Noted: 2017-10-12 | Resolved: 2019-04-03

## 2019-04-03 PROCEDURE — 99215 OFFICE O/P EST HI 40 MIN: CPT | Performed by: INTERNAL MEDICINE

## 2019-04-03 RX ORDER — DONEPEZIL HYDROCHLORIDE 5 MG/1
5 TABLET, FILM COATED ORAL DAILY
Qty: 90 TABLET | Refills: 3 | Status: SHIPPED | OUTPATIENT
Start: 2019-04-03 | End: 2019-10-16

## 2019-04-03 RX ORDER — ESCITALOPRAM OXALATE 10 MG/1
10 TABLET ORAL DAILY
Qty: 90 TABLET | Refills: 3 | Status: SHIPPED | OUTPATIENT
Start: 2019-04-03 | End: 2019-10-16

## 2019-04-03 ASSESSMENT — MIFFLIN-ST. JEOR: SCORE: 1031.95

## 2019-04-03 NOTE — PROGRESS NOTES
"MEMORY EVALUATION CLINIC - FOLLOWUP    INTERVAL HISTORY: Afia is here today in follow up with her  Sukhjinder in regards to dementia and anxiety.     Occupational therapy CPT was 4.8/5.6 in 2018 with struggles involving some repetition/perseveration of tasks until she figured it out though getting frustrated some along the way. Recall MoCA  in 2018 and she doesn't like being put on the spot to complete it again. Donepezil was added to Lexapro in Oct 2018. Weight up a couple pounds which is good. Some pleasant dreams is the only notable s/e of Donepezil. Unsure if there has been any benefit from the medication. Still some word finding difficulty at times and STM loss. She really enjoys her family (, kids, grandkids). Keeps busy during the day. Anxiety remains better controlled with aide of Lexapro. Her mother  at the age of 94 years old on hospice recently and had a good life. Sukhjinder and Afia may move into her mother's apartment to down-size. She has told her kids and sisters about her dementia diagnosis. No major concerns today.      ADLs: Independent  Driving: Stopped driving to new locations or at night;  slowly taking over the driving  Finances: Sukhjinder mostly  Shopping/housekeeping/meal prep: Sukhjinder is cooking more b/c he likes to do so though she is still involved with cleaning/housework  Medications: Independent though Sukhjinder will check over it from time to time  Home situation: Home with   Support: Family  Behaviors/Hallucinations/Delusions: Mild chronic anxiety which is stable      REVIEW OF SYSTEMS: Detailed as above       OBJECTIVE: /76 (BP Location: Right arm, Cuff Size: Adult Regular)   Pulse 68   Temp 99.3  F (37.4  C) (Oral)   Ht 1.607 m (5' 3.25\")   Wt 54.4 kg (119 lb 14.4 oz)   LMP  (LMP Unknown)   SpO2 98%   BMI 21.07 kg/m    Alert, bright, cheerful, robust  Normal speech and gait, no tremor        ASSESSMENT/PLAN: 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 Lexapro has been helpful; Donepezil unsure if any help but there hasn't been any negative s/e from it either. Both she and her  mutually agree that continuing on this medication regimen for now is a good idea. They help each other out and partner together in their daily lives. Discussed future MoCA and she will consider doing it next visit (though it makes her anxious). She also is planning on completing mammogram, colonoscopy (h/o polyp) and Shingrix.       Patient Instructions   Reasonable to pursue colonoscopy and mammogram    [facility]: [phone]    Shingrix at your local pharmacy    Medications refilled    Follow up in about 6 months (Marina  to contact you)        MDM: >45 minutes spent with patient/, over 50% time counseling, coordinating care and explaining about nature of the patient's conditions.    Teagan Tang, DO  Internal Medicine and Geriatrics  [facility]

## 2019-04-03 NOTE — PATIENT INSTRUCTIONS
Reasonable to pursue colonoscopy and mammogram    [facility]: [phone]    Shingrix at your local pharmacy    Medications refilled    Follow up in about 6 months (Marina  to contact you)

## 2019-10-16 ENCOUNTER — OFFICE VISIT (OUTPATIENT)
Dept: FAMILY MEDICINE | Facility: CLINIC | Age: 74
End: 2019-10-16
Payer: COMMERCIAL

## 2019-10-16 VITALS
HEART RATE: 57 BPM | HEIGHT: 63 IN | SYSTOLIC BLOOD PRESSURE: 126 MMHG | TEMPERATURE: 97.5 F | BODY MASS INDEX: 21.93 KG/M2 | OXYGEN SATURATION: 100 % | WEIGHT: 123.8 LBS | DIASTOLIC BLOOD PRESSURE: 68 MMHG

## 2019-10-16 DIAGNOSIS — F03.90 DEMENTIA WITHOUT BEHAVIORAL DISTURBANCE, UNSPECIFIED DEMENTIA TYPE: Primary | ICD-10-CM

## 2019-10-16 DIAGNOSIS — F41.9 ANXIETY: ICD-10-CM

## 2019-10-16 DIAGNOSIS — Z12.31 ENCOUNTER FOR SCREENING MAMMOGRAM FOR BREAST CANCER: ICD-10-CM

## 2019-10-16 PROCEDURE — 99215 OFFICE O/P EST HI 40 MIN: CPT | Performed by: INTERNAL MEDICINE

## 2019-10-16 RX ORDER — ESCITALOPRAM OXALATE 10 MG/1
10 TABLET ORAL DAILY
Qty: 90 TABLET | Refills: 3 | Status: SHIPPED | OUTPATIENT
Start: 2019-10-16 | End: 2020-11-17

## 2019-10-16 RX ORDER — DONEPEZIL HYDROCHLORIDE 5 MG/1
5 TABLET, FILM COATED ORAL DAILY
Qty: 90 TABLET | Refills: 3 | Status: SHIPPED | OUTPATIENT
Start: 2019-10-16 | End: 2020-10-16

## 2019-10-16 ASSESSMENT — MIFFLIN-ST. JEOR: SCORE: 1044.64

## 2019-10-16 NOTE — PATIENT INSTRUCTIONS
Medications were refilled for a year    [facility]: [phone]    Breast center on 2nd floor (they should call to schedule mammogram)    Goal to get involved with one social activity in the new year    Continue partnering together with Sukhjinder on daily activities as you are doing    Follow up annually or sooner as needed - Call if questions [phone]

## 2019-10-16 NOTE — PROGRESS NOTES
"MEMORY EVALUATION CLINIC - FOLLOWUP    INTERVAL HISTORY: Afia is here today in follow up with her  Sukhjinder in regards to dementia and anxiety. No major changes since our last visit in April other than down-sizing and moving into her mother's old coop apartment which they feel is a good move in a neighborhood which is familiar. She has been busy going through all of their things which she actually enjoys. Sukhjinder does a lot of the other household tasks which he too enjoys. There were a few days when they were out of town when she didn't have her medication and it seemed like she missed them. They agree that current Rx seems beneficial and want to continue them. They are continuing to consider pros-cons of preventative health care. Afia has no acute concerns. She is looking forward to family time over the holidays.    ADLs: Independent  Driving: Stopped driving to new locations or at night;  slowly taking over the driving  Finances: Sukhjinder mostly  Shopping/housekeeping/meal prep: Sukhjinder is cooking more b/c he likes to do so though she is still involved with cleaning/housework  Medications: Independent though Sukhjinder will check over it from time to time  Home situation: Apartment with   Support: Family  Behaviors/Hallucinations/Delusions: Mild chronic anxiety which is stable    REVIEW OF SYSTEMS: Detailed as above       OBJECTIVE:  /68 (BP Location: Right arm, Patient Position: Sitting, Cuff Size: Adult Regular)   Pulse 57   Temp 97.5  F (36.4  C) (Oral)   Ht 1.607 m (5' 3.25\")   Wt 56.2 kg (123 lb 12.8 oz)   LMP  (LMP Unknown)   SpO2 100%   BMI 21.76 kg/m    Alert, pleasant, sweet lady who appears stated age  Able to participate in conversation well without repetition or significant word finding difficulty    INVESTIGATIONS: TSH and B12 within normal limits in 2017; no head imaging on file      PRIOR TESTING: Occupational therapy CPT was 4.8/5.6 in March 2018 with struggles involving some " repetition/perseveration of tasks until she figured it out though getting frustrated some along the way. Recall MoCA 21/30 in Jan 2018.      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 Lexapro has been helpful; Donepezil unsure if any help but there hasn't been any negative s/e from it either. Both she and her  mutually agree that continuing on this medication regimen for now is a good idea. They help each other out and partner together in their daily lives. MoCA exams make her very nervous so she isn't wanting to repeat it. Discussed preventative health care as well as getting involved with activities at their new coop as they get settled.       Patient Instructions   Medications were refilled for a year    [facility]: [phone]    Breast center on 2nd floor (they should call to schedule mammogram)    Goal to get involved with one social activity in the new year    Continue partnering together with Sukhjinder on daily activities as you are doing    Follow up annually or sooner as needed - Call if questions [phone]              MDM: 50 minutes (3:10 pm - 4:00 pm) F2F time spent with patient/, over 50% time counseling, coordinating care and explaining about nature of the patient's conditions including dementia, anxiety and preventative health care topics as I am still PCP.    Teagan Tang, DO  Internal Medicine and Geriatrics

## 2019-10-31 ENCOUNTER — HEALTH MAINTENANCE LETTER (OUTPATIENT)
Age: 74
End: 2019-10-31

## 2019-11-12 ENCOUNTER — HOSPITAL ENCOUNTER (OUTPATIENT)
Dept: MAMMOGRAPHY | Facility: CLINIC | Age: 74
Discharge: HOME | End: 2019-11-12
Attending: INTERNAL MEDICINE | Admitting: INTERNAL MEDICINE
Payer: COMMERCIAL

## 2019-11-12 DIAGNOSIS — Z12.31 ENCOUNTER FOR SCREENING MAMMOGRAM FOR BREAST CANCER: ICD-10-CM

## 2019-11-12 PROCEDURE — 77063 BREAST TOMOSYNTHESIS BI: CPT

## 2019-12-16 ENCOUNTER — HEALTH MAINTENANCE LETTER (OUTPATIENT)
Age: 74
End: 2019-12-16

## 2020-04-03 ENCOUNTER — TELEPHONE (OUTPATIENT)
Dept: FAMILY MEDICINE | Facility: CLINIC | Age: 75
End: 2020-04-03

## 2020-04-03 NOTE — TELEPHONE ENCOUNTER
Reason for Call:  Other appointment    Detailed comments:   Being discharge from TCU for franctures in left hip.  Pt is needing a TCU follow up.  Pt has had no cough/cold/fever.  Pt's last PCP was [facility].  She has not been seen here for primary since 2018.  Please return call about scheduling.      [phone]  Joe from [facility]    Phone Number Patient can be reached at: Home number on file [phone] (home)    Best Time: Any     Can we leave a detailed message on this number? YES    Call taken on 4/3/2020 at 1:47 PM by Little Stover

## 2020-04-06 ENCOUNTER — DOCUMENTATION ONLY (OUTPATIENT)
Dept: CARE COORDINATION | Facility: CLINIC | Age: 75
End: 2020-04-06

## 2020-04-06 NOTE — PROGRESS NOTES
Dear Dr. Tang  Medicare Home Health regulations requires [facility] to provide an initial assessment visit either within 48 hours of the patient's return home, or on the physician ordered Start of Care date.    There will be a delay in the Initial Assessment for Afia Sylvester; MRN [id number]  We anticipate the Start of care will be 4/7/20.      Sincerely [facility]  Inés Ahumada RN  [phone]

## 2020-04-06 NOTE — TELEPHONE ENCOUNTER
"I called Joe at [facility] and he said the \"patient is already gone.  Thanks for calling back though. \"    [facility] [phone] is no longer in service     Emergency contact  [facility] no longer in service.  Cell ph has no identifying  name, no message left.       "

## 2020-04-07 ENCOUNTER — TELEPHONE (OUTPATIENT)
Dept: FAMILY MEDICINE | Facility: CLINIC | Age: 75
End: 2020-04-07

## 2020-04-07 NOTE — TELEPHONE ENCOUNTER
[facility] now requests orders and shares plan of care/discharge summaries for some patients through [employer].  Please REPLY TO THIS MESSAGE OR ROUTE BACK TO THE AUTHOR in order to give authorization for orders when needed.  This is considered a verbal order, you will still receive a faxed copy of orders for signature.  Thank you for your assistance in improving collaboration for our patients.    ORDER: Home PT 1w2 for strength, balance, and ambulation following L femur fx.                   Home OT Eval for bathroom safety, ADLs, and equipment needs.     Leni Conway, PT, DPT  [email or web address]  [phone]

## 2020-04-07 NOTE — TELEPHONE ENCOUNTER
I was old PCP for Afia, and then continued to follow her in memory clinic. She is pretty healthy other than dementia and anxiety. Now has new left hip fracture s/p surgery at [facility] and short TCU stay at [place]. I called  Sukhjinder (only number on Afia's profile that is accurate is the mobile phone number for Sukhjinder [phone]). He says she is stable and participating in [facility] for occupational therapy and physical therapy. They have f/u with ortho on April 15th. They'd like to do follow up telehealth visit with me the week of April 20th sometime; will ask Marina Sutton of [facility] to help schedule.    1) Marina, please call  Sukhjinder ([phone]) to schedule f/u telehealth visit anytime the week of April 20th (preferably not Monday though).    2) [facility], please note only accurate number for her as above and update her chart accordingly. Also, I'm ok remaining PCP and no f/u with Dr. Crawford needed. Would be hard to establish during this time of COVID and I know her well. There was an encounter in Epic re: trying to set up follow up appointment with Dr. Crawford so that could be closed.    3) [facility], I approve orders.    Thanks!

## 2020-04-22 ENCOUNTER — PATIENT OUTREACH (OUTPATIENT)
Dept: CARE COORDINATION | Facility: CLINIC | Age: 75
End: 2020-04-22

## 2020-04-22 ENCOUNTER — VIRTUAL VISIT (OUTPATIENT)
Dept: FAMILY MEDICINE | Facility: CLINIC | Age: 75
End: 2020-04-22
Payer: COMMERCIAL

## 2020-04-22 DIAGNOSIS — D62 POSTOPERATIVE ANEMIA DUE TO ACUTE BLOOD LOSS: ICD-10-CM

## 2020-04-22 DIAGNOSIS — F41.9 ANXIETY: ICD-10-CM

## 2020-04-22 DIAGNOSIS — F03.90 DEMENTIA WITHOUT BEHAVIORAL DISTURBANCE, UNSPECIFIED DEMENTIA TYPE: ICD-10-CM

## 2020-04-22 DIAGNOSIS — S72.002A: Primary | ICD-10-CM

## 2020-04-22 DIAGNOSIS — M81.0 OSTEOPOROSIS, UNSPECIFIED OSTEOPOROSIS TYPE, UNSPECIFIED PATHOLOGICAL FRACTURE PRESENCE: Chronic | ICD-10-CM

## 2020-04-22 DIAGNOSIS — S72.142D CLOSED DISPLACED INTERTROCHANTERIC FRACTURE OF LEFT FEMUR WITH ROUTINE HEALING: Primary | ICD-10-CM

## 2020-04-22 DIAGNOSIS — E55.9 VITAMIN D DEFICIENCY: ICD-10-CM

## 2020-04-22 PROCEDURE — 99443 ZZC PHYSICIAN TELEPHONE EVALUATION 21-30 MIN: CPT | Performed by: INTERNAL MEDICINE

## 2020-04-22 NOTE — PROGRESS NOTES
"Telephone visit  [facility]    Afia Sylvester is a 74 year old year old adult who is being evaluated via a billable telephone visit due to the restrictions of the COVID-19 pandemic. The patient has been notified of following: \"This telephone visit will be conducted via a call between you and your provider. We have found that certain health care needs can be provided without the need for a physical exam. This service lets us provide the care you need with a short phone conversation. If a prescription is necessary we will work with the facility you are at and can send it directly to your pharmacy. If lab work is needed we can place an order to have it drawn at the facility you are at. If during the course of the call the provider feels a telephone visit is not appropriate, you will not be charged for this service.\"   Patient or Patient's first contact has given verbal consent for Telephone visit?  Yes  Place of Location at the time of visit: [facility]   Received verbal consent to use Care Everywhere in order to access labs, documents, histories, and all other needed information to provide care at current facility.    Afia Sylvester complains of :   Chief Complaint   Patient presents with     Hospital F/U     I have reviewed and updated the patient's Past Medical History, Social History, Family History and Medication List.    ALLERGIES:   Allergies   Allergen Reactions     No Known Allergies       HPI: HPI information obtained from: patient report and family/first contact  Sukhjinder report.     Reviewed CareEverywhere [facility] hospital stay 3/26/20-3/30/20 after fall working in garage with her  and sustaining left hip fracture. Had operative repair with left hip IMN. Postop had hypotension resolved with fluid bolus and temporary post-op fever. Hospital aware of her dx of dementia and monitored for delirium without note of that being a significant concern. Hgb at discharge 7.4. DVT " "prophy advised per ortho with Lovenox 40 mg daily x 28 days. Went to [facility] for TCU care for 4 nights and then was able to be discharged home. Says didn't really get much help there and was able to go home with home care and assistance of her  Sukhjinder who is a retired family physician.     She had a great experience with [facility] and has now completed this - says happily \"I graduated\"! No longer using a walker and incision is healed with dissolvable sutures. Did have telephone visit with [facility] ortho last week that went well. Pain controlled on over the counter medication(s). No longer on stool softners. Is taking Vit D 2000 but only a few times per week so I advised increasing dose d/t slightly low levels. Is \"mike\" in the cheeks but they hadn't been aware of how low her Hgb had gotten so were happy to hear I'd followed up on this and they suggested trying some oral iron for awhile. No signs of blood loss. Minimal swelling remains in left leg and is able to wear her regular shoes. Still taking Lovenox a few more days til completed.      From cognitive standpoint, I don't have Lolis Randall's discharge information. She and  feel she is stable. Does have STM loss and needs reminders but generally unchanged. They still feel she benefited from initiation of Lexapro and Donepezil and want to continue these medications.  Dreams a lot but not bothersome to her. 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 Sukhjinder does the cooking and she the cleaning and feel they have a good daily routine.      MEDICATIONS: Reviewed medications with them over the phone; Lovenox DVT prophy only is going to continue for a few more days and then course will be completed  Current Outpatient Medications   Medication Sig Dispense Refill     Cholecalciferol (VITAMIN D) 2000 UNITS tablet Take 1 tablet by mouth daily       donepezil (ARICEPT) 5 MG tablet Take 1 tablet (5 mg) by mouth daily 90 tablet 3     enoxaparin " ANTICOAGULANT (LOVENOX) 40 MG/0.4ML syringe Inject 40 mg Subcutaneous daily 28 day postop DVT prophy only per ortho and then discontinue       escitalopram (LEXAPRO) 10 MG tablet Take 1 tablet (10 mg) by mouth daily 90 tablet 3     Ferrous Sulfate (IRON PO) OTC iron every other day           Labs:   Hgb 7.4 at time of hospital discharge 3/30/20 (up from 7.1 musa earlier on in hospital stay); admit Hgb was normal at 11.8  Per CareEverywhere, normal Cr 0.62 with normal electrolytes  Vit D slightly low at 16 (range 21-70)    Assessment/Plan:  Closed displaced intertrochanteric fracture of left femur with routine healing  Remarkable recovery as noted in HPI; completed [facility] therapies with good results  Has [facility] f/u one more time in the coming weeks    Osteoporosis, unspecified osteoporosis type, unspecified pathological fracture presence  Vitamin D deficiency  Slightly low Vit D at 16 so I encouraged her to take the Vit D supplement daily rather than a few times per week as she was doing  Has yogurt and milk daily  Great weight bearing exercises  Did not discuss bisphosphonates today but per chart review she has already been on Fosamax in the past    Postoperative anemia due to acute blood loss  Expected blood loss; suspect Hgb will slowly drift upwards now  They want to trial iron with monitoring for GI s/e  Due to COVID-19 pandemic, reasonable to limit coming in for lab recheck unless symptoms - could check Hgb at next in-office visit    Dementia without behavioral disturbance, unspecified dementia type (H)  Anxiety  Stable and has supportive ; he says was wake a up call to him to monitor her activity level to try to avoid accidents like that happened and contributed to her fall and hip fracture  Continue Lexapro and Donepezil     Follow up as originally planned in Oct 2020 in memory clinic; consider Hgb recheck at that time as well as discussion re: bisphosphonate therapy      Phone call  duration:  22 minutes (1:05 PM - 1:27 PM)    Teagan Tang DO   [facility]

## 2020-04-22 NOTE — PROGRESS NOTES
Clinic Care Coordination Contact    Situation: Patient chart reviewed by care coordinator.    Home care discharge follow up     Background: 3/26/2020  [facility] Closed intertrochanteric fracture of hip, left, initial encounter    Assessment: Outreach attempted.    Phone disconnected     Plan/Recommendations: Patient has a hospital follow up with PCP Dr Teagan Tang today at 1:00    No further outreaches will be made.  Dr Teagan Tang will place a future CC order if needed     [facility]     Ann-Marie Weldon  RN Care Coordinator   [facility] / [facility]   Phone: [phone]  Email :  [email or web address]

## 2020-10-14 DIAGNOSIS — F03.90 DEMENTIA WITHOUT BEHAVIORAL DISTURBANCE, UNSPECIFIED DEMENTIA TYPE: ICD-10-CM

## 2020-10-16 RX ORDER — DONEPEZIL HYDROCHLORIDE 5 MG/1
5 TABLET, FILM COATED ORAL DAILY
Qty: 90 TABLET | Refills: 0 | Status: SHIPPED | OUTPATIENT
Start: 2020-10-16 | End: 2020-11-17

## 2020-10-16 NOTE — TELEPHONE ENCOUNTER
Medication filled 1 time as pt is due for a follow-up in clinic. Pharmacy has been notified to inform patient to call clinic and schedule appointment. and Note on Sig line

## 2020-11-18 ENCOUNTER — OFFICE VISIT (OUTPATIENT)
Dept: FAMILY MEDICINE | Facility: CLINIC | Age: 75
End: 2020-11-18
Payer: COMMERCIAL

## 2020-11-18 VITALS
TEMPERATURE: 97.2 F | OXYGEN SATURATION: 98 % | HEIGHT: 63 IN | HEART RATE: 56 BPM | DIASTOLIC BLOOD PRESSURE: 65 MMHG | SYSTOLIC BLOOD PRESSURE: 103 MMHG | WEIGHT: 131.9 LBS | BODY MASS INDEX: 23.37 KG/M2

## 2020-11-18 DIAGNOSIS — R31.29 MICROSCOPIC HEMATURIA: ICD-10-CM

## 2020-11-18 DIAGNOSIS — R82.90 NONSPECIFIC FINDING ON EXAMINATION OF URINE: ICD-10-CM

## 2020-11-18 DIAGNOSIS — D62 POSTOPERATIVE ANEMIA DUE TO ACUTE BLOOD LOSS: ICD-10-CM

## 2020-11-18 DIAGNOSIS — F03.90 DEMENTIA WITHOUT BEHAVIORAL DISTURBANCE, UNSPECIFIED DEMENTIA TYPE: Primary | ICD-10-CM

## 2020-11-18 DIAGNOSIS — M81.0 OSTEOPOROSIS, UNSPECIFIED OSTEOPOROSIS TYPE, UNSPECIFIED PATHOLOGICAL FRACTURE PRESENCE: ICD-10-CM

## 2020-11-18 DIAGNOSIS — F41.9 ANXIETY: ICD-10-CM

## 2020-11-18 LAB
ALBUMIN UR-MCNC: ABNORMAL MG/DL
APPEARANCE UR: CLEAR
BACTERIA #/AREA URNS HPF: ABNORMAL /HPF
BILIRUB UR QL STRIP: NEGATIVE
COLOR UR AUTO: YELLOW
GLUCOSE UR STRIP-MCNC: NEGATIVE MG/DL
HGB BLD-MCNC: 12.6 G/DL (ref 11.7–15.7)
HGB UR QL STRIP: ABNORMAL
KETONES UR STRIP-MCNC: NEGATIVE MG/DL
LEUKOCYTE ESTERASE UR QL STRIP: ABNORMAL
NITRATE UR QL: NEGATIVE
NON-SQ EPI CELLS #/AREA URNS LPF: ABNORMAL /LPF
PH UR STRIP: 7 PH (ref 5–7)
RBC #/AREA URNS AUTO: ABNORMAL /HPF
SOURCE: ABNORMAL
SP GR UR STRIP: 1.02 (ref 1–1.03)
UROBILINOGEN UR STRIP-ACNC: 0.2 EU/DL (ref 0.2–1)
WBC #/AREA URNS AUTO: ABNORMAL /HPF

## 2020-11-18 PROCEDURE — 36415 COLL VENOUS BLD VENIPUNCTURE: CPT | Performed by: INTERNAL MEDICINE

## 2020-11-18 PROCEDURE — 99214 OFFICE O/P EST MOD 30 MIN: CPT | Performed by: INTERNAL MEDICINE

## 2020-11-18 PROCEDURE — 81001 URINALYSIS AUTO W/SCOPE: CPT | Performed by: INTERNAL MEDICINE

## 2020-11-18 PROCEDURE — 82306 VITAMIN D 25 HYDROXY: CPT | Performed by: INTERNAL MEDICINE

## 2020-11-18 PROCEDURE — 80048 BASIC METABOLIC PNL TOTAL CA: CPT | Performed by: INTERNAL MEDICINE

## 2020-11-18 PROCEDURE — 85018 HEMOGLOBIN: CPT | Performed by: INTERNAL MEDICINE

## 2020-11-18 PROCEDURE — 87086 URINE CULTURE/COLONY COUNT: CPT | Performed by: INTERNAL MEDICINE

## 2020-11-18 RX ORDER — DONEPEZIL HYDROCHLORIDE 5 MG/1
5 TABLET, FILM COATED ORAL DAILY
Qty: 90 TABLET | Refills: 3 | Status: SHIPPED | OUTPATIENT
Start: 2020-11-18 | End: 2021-11-23

## 2020-11-18 RX ORDER — ESCITALOPRAM OXALATE 10 MG/1
10 TABLET ORAL DAILY
Qty: 90 TABLET | Refills: 3 | Status: SHIPPED | OUTPATIENT
Start: 2020-11-18 | End: 2021-11-16

## 2020-11-18 RX ORDER — ALENDRONATE SODIUM 70 MG/1
70 TABLET ORAL
Qty: 12 TABLET | Refills: 3 | Status: SHIPPED | OUTPATIENT
Start: 2020-11-18 | End: 2021-10-05

## 2020-11-18 SDOH — HEALTH STABILITY: MENTAL HEALTH: HOW MANY STANDARD DRINKS CONTAINING ALCOHOL DO YOU HAVE ON A TYPICAL DAY?: NOT ASKED

## 2020-11-18 SDOH — HEALTH STABILITY: MENTAL HEALTH: HOW OFTEN DO YOU HAVE 6 OR MORE DRINKS ON ONE OCCASION?: NOT ASKED

## 2020-11-18 SDOH — HEALTH STABILITY: MENTAL HEALTH: HOW OFTEN DO YOU HAVE A DRINK CONTAINING ALCOHOL?: NOT ASKED

## 2020-11-18 ASSESSMENT — MIFFLIN-ST. JEOR: SCORE: 1076.38

## 2020-11-18 NOTE — PROGRESS NOTES
MEMORY EVALUATION CLINIC - FOLLOWUP    INTERVAL HISTORY: Mrs. Afia Sylvester is a delightful 75yoF with history of anxiety and dementia as well as recent diagnosis of osteoporosis s/p fall and left hip fracture in March 2020 with operative repair at [facility]. Had postop anemia and Hgb at discharge 7.4 with short TCU stay prior to returning home with her  Dr. Sukhjinder Sylvester. She had a great experience with home care and progressed well; also took supplemental iron. Due to COVID-19 pandemic, no follow up since our last virtual visit in April 2020. In previous visits we discussed how she benefited from both Lexapro and Donepezil medications and they partner together so well in their daily lives with involved adult children.    Afia and Sukhjinder are here together today; no significant concerns. They both feel the Donepezil and Lexapro have been helpful and wish to continue on them. She does have dreams most nights but they tend to be pleasant. No dizziness but slight georgie in 50s on Donepezil. Afia and Sukhjinder continue to partner together on daily activities well and enjoy each other's company amid the pandemic.    She was on Fosamax about 6 years and tolerated but then drug holiday for at least the past 3 years. Good calcium in diet and vitamin D supplement and weight bearing with walking. Willing to retrial Fosamax for awhile until COVID-19 pandemic calms and then revisit DEXA with consideration of Prolia or Reclast. She is up to date on mammogram and she and her  decided not to pursue another colonoscopy based on goals of care.          ADLs: Independent  Driving: Retired from driving  Finances: Sukhjinder mostly  Shopping/housekeeping/meal prep: Sukhjinder is cooking more b/c he likes to do so though she is still involved with cleaning/housework  Medications: Not specifically addressed today; Sukhjinder is definitely aware of medications Afia takes  Home situation: Apartment coop with   Support:  "Family  Behaviors/Hallucinations/Delusions: None      REVIEW OF SYSTEMS: Detailed as above       OBJECTIVE:  /65 (BP Location: Right arm, Cuff Size: Adult Regular)   Pulse 56   Temp 97.2  F (36.2  C) (Oral)   Ht 1.607 m (5' 3.25\")   Wt 59.8 kg (131 lb 14.4 oz)   LMP  (LMP Unknown)   SpO2 98%   BMI 23.18 kg/m    Alert, pleasant, NAD  Sweet disposition, well groomed  Some word finding difficulty noted with vague speech  Not anxious appearing  Normal robust gait without assist device    INVESTIGATIONS: TSH and B12 within normal limits in 2017; no head imaging on file      PRIOR TESTING: Occupational therapy CPT was 4.8/5.6 in March 2018 with struggles involving some repetition/perseveration of tasks until she figured it out though getting frustrated some along the way. Recall MoCA 21/30 in Jan 2018 and she gets quite nervous with MoCA so hasn't really wanted to repeat the testing.        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 Lexapro and Donepezil have been helpful medications and she and her  partner well together in their lives. Spent time today discussing her recent hip fracture this spring and she has recovered from that beautifully. Goal to prevent recurrent osteoporotic fractures. Per Sukhjinder's recollection, she was on Fosamax about 6 years and tolerated but then went on a drug holiday for at least the past 3 years. Good calcium in diet and vitamin D supplement and weight bearing with walking. Willing to retrial Fosamax for awhile until COVID-19 pandemic calms and then revisit DEXA with consideration of Prolia or Reclast. Basic labs today ordered to check on creatinine and vitamin D in setting of osteoporosis. Also Hgb to compare to her postop blood loss. Finally, no signs/sx of UTI but  Sukhjinder had seen old UA " showing microscopic hematuria so suggested appropriately recheck UA today.      Patient Instructions   Continue Donepezil and Lexapro    Add Fosamax for bone strength    Labs today (check in [address]) and [facility]    Follow up about 6 months        MDM: 35 minutes (11:25 AM - 12:00 PM) F2F time spent with patient/, over 50% time counseling, coordinating care and explaining about nature of dementia, anxiety and osteoporosis.    Teagan Tang, DO  Internal Medicine and Geriatrics

## 2020-11-18 NOTE — PATIENT INSTRUCTIONS
Continue Donepezil and Lexapro    Add Fosamax for bone strength    Labs today (check in [address]) and results will come to you on MyChart    Follow up about 6 months

## 2020-11-19 LAB
ANION GAP SERPL CALCULATED.3IONS-SCNC: 6 MMOL/L (ref 3–14)
BACTERIA SPEC CULT: NORMAL
BUN SERPL-MCNC: 15 MG/DL (ref 7–30)
CALCIUM SERPL-MCNC: 9.3 MG/DL (ref 8.5–10.1)
CHLORIDE SERPL-SCNC: 108 MMOL/L (ref 94–109)
CO2 SERPL-SCNC: 26 MMOL/L (ref 20–32)
CREAT SERPL-MCNC: 0.75 MG/DL (ref 0.52–1.04)
DEPRECATED CALCIDIOL+CALCIFEROL SERPL-MC: 31 UG/L (ref 20–75)
GFR SERPL CREATININE-BSD FRML MDRD: 79 ML/MIN/{1.73_M2}
GLUCOSE SERPL-MCNC: 79 MG/DL (ref 70–99)
POTASSIUM SERPL-SCNC: 4.3 MMOL/L (ref 3.4–5.3)
SODIUM SERPL-SCNC: 140 MMOL/L (ref 133–144)
SPECIMEN SOURCE: NORMAL

## 2021-01-15 ENCOUNTER — HEALTH MAINTENANCE LETTER (OUTPATIENT)
Age: 76
End: 2021-01-15

## 2021-09-05 ENCOUNTER — HEALTH MAINTENANCE LETTER (OUTPATIENT)
Age: 76
End: 2021-09-05

## 2021-10-01 ENCOUNTER — TRANSFERRED RECORDS (OUTPATIENT)
Dept: HEALTH INFORMATION MANAGEMENT | Facility: CLINIC | Age: 76
End: 2021-10-01

## 2021-10-04 DIAGNOSIS — M81.0 OSTEOPOROSIS, UNSPECIFIED OSTEOPOROSIS TYPE, UNSPECIFIED PATHOLOGICAL FRACTURE PRESENCE: ICD-10-CM

## 2021-10-05 RX ORDER — ALENDRONATE SODIUM 70 MG/1
TABLET ORAL
Qty: 12 TABLET | Refills: 3 | Status: SHIPPED | OUTPATIENT
Start: 2021-10-05 | End: 2022-01-24 | Stop reason: ALTCHOICE

## 2021-10-05 NOTE — TELEPHONE ENCOUNTER
No order of DX HIP/PELVIS/SPINE is found.     Please refill as appropriate.  Thank you,    Whitney Burks RN  [facility]

## 2021-11-15 DIAGNOSIS — F41.9 ANXIETY: ICD-10-CM

## 2021-11-16 RX ORDER — ESCITALOPRAM OXALATE 10 MG/1
TABLET ORAL
Qty: 90 TABLET | Refills: 0 | Status: SHIPPED | OUTPATIENT
Start: 2021-11-16 | End: 2021-11-23

## 2021-11-23 ENCOUNTER — OFFICE VISIT (OUTPATIENT)
Dept: FAMILY MEDICINE | Facility: CLINIC | Age: 76
End: 2021-11-23
Payer: COMMERCIAL

## 2021-11-23 VITALS
RESPIRATION RATE: 16 BRPM | WEIGHT: 137 LBS | BODY MASS INDEX: 24.27 KG/M2 | DIASTOLIC BLOOD PRESSURE: 74 MMHG | HEART RATE: 61 BPM | OXYGEN SATURATION: 100 % | HEIGHT: 63 IN | TEMPERATURE: 97.1 F | SYSTOLIC BLOOD PRESSURE: 130 MMHG

## 2021-11-23 DIAGNOSIS — Z76.89 ENCOUNTER TO ESTABLISH CARE: ICD-10-CM

## 2021-11-23 DIAGNOSIS — Z13.29 SCREENING FOR THYROID DISORDER: ICD-10-CM

## 2021-11-23 DIAGNOSIS — Z79.899 MEDICATION MANAGEMENT: ICD-10-CM

## 2021-11-23 DIAGNOSIS — Z13.220 SCREENING FOR LIPID DISORDERS: ICD-10-CM

## 2021-11-23 DIAGNOSIS — F03.90 DEMENTIA WITHOUT BEHAVIORAL DISTURBANCE, UNSPECIFIED DEMENTIA TYPE: Primary | ICD-10-CM

## 2021-11-23 DIAGNOSIS — M81.0 OSTEOPOROSIS, UNSPECIFIED OSTEOPOROSIS TYPE, UNSPECIFIED PATHOLOGICAL FRACTURE PRESENCE: ICD-10-CM

## 2021-11-23 DIAGNOSIS — Z13.0 SCREENING FOR DEFICIENCY ANEMIA: ICD-10-CM

## 2021-11-23 DIAGNOSIS — Z71.89 ADVANCED DIRECTIVES, COUNSELING/DISCUSSION: ICD-10-CM

## 2021-11-23 DIAGNOSIS — F41.9 ANXIETY: ICD-10-CM

## 2021-11-23 DIAGNOSIS — H61.22 IMPACTED CERUMEN OF LEFT EAR: ICD-10-CM

## 2021-11-23 LAB
ERYTHROCYTE [DISTWIDTH] IN BLOOD BY AUTOMATED COUNT: 13.4 % (ref 10–15)
HCT VFR BLD AUTO: 40.9 % (ref 35–47)
HGB BLD-MCNC: 13.4 G/DL (ref 11.7–15.7)
MCH RBC QN AUTO: 31.4 PG (ref 26.5–33)
MCHC RBC AUTO-ENTMCNC: 32.8 G/DL (ref 31.5–36.5)
MCV RBC AUTO: 96 FL (ref 78–100)
PLATELET # BLD AUTO: 328 10E3/UL (ref 150–450)
RBC # BLD AUTO: 4.27 10E6/UL (ref 3.8–5.2)
WBC # BLD AUTO: 6.2 10E3/UL (ref 4–11)

## 2021-11-23 PROCEDURE — 80061 LIPID PANEL: CPT | Performed by: INTERNAL MEDICINE

## 2021-11-23 PROCEDURE — 84443 ASSAY THYROID STIM HORMONE: CPT | Performed by: INTERNAL MEDICINE

## 2021-11-23 PROCEDURE — 85027 COMPLETE CBC AUTOMATED: CPT | Performed by: INTERNAL MEDICINE

## 2021-11-23 PROCEDURE — 80053 COMPREHEN METABOLIC PANEL: CPT | Performed by: INTERNAL MEDICINE

## 2021-11-23 PROCEDURE — 82728 ASSAY OF FERRITIN: CPT | Performed by: INTERNAL MEDICINE

## 2021-11-23 PROCEDURE — 82607 VITAMIN B-12: CPT | Performed by: INTERNAL MEDICINE

## 2021-11-23 PROCEDURE — 99215 OFFICE O/P EST HI 40 MIN: CPT | Performed by: INTERNAL MEDICINE

## 2021-11-23 PROCEDURE — 36415 COLL VENOUS BLD VENIPUNCTURE: CPT | Performed by: INTERNAL MEDICINE

## 2021-11-23 RX ORDER — CHOLECALCIFEROL (VITAMIN D3) 50 MCG
1 TABLET ORAL
Status: CANCELLED | COMMUNITY
Start: 2021-11-24

## 2021-11-23 RX ORDER — DONEPEZIL HYDROCHLORIDE 5 MG/1
5 TABLET, FILM COATED ORAL DAILY
Qty: 90 TABLET | Refills: 3 | Status: SHIPPED | OUTPATIENT
Start: 2021-11-23 | End: 2022-01-01

## 2021-11-23 RX ORDER — ESCITALOPRAM OXALATE 10 MG/1
10 TABLET ORAL DAILY
Qty: 90 TABLET | Refills: 3 | Status: SHIPPED | OUTPATIENT
Start: 2021-11-23 | End: 2022-01-13

## 2021-11-23 ASSESSMENT — ENCOUNTER SYMPTOMS
SORE THROAT: 0
DIARRHEA: 0
COUGH: 0
NERVOUS/ANXIOUS: 1
ABDOMINAL PAIN: 0
CHILLS: 0
FREQUENCY: 0
PARESTHESIAS: 0
DYSURIA: 0
NAUSEA: 0
HEMATURIA: 0
FEVER: 0
JOINT SWELLING: 0
DIZZINESS: 0
BREAST MASS: 0
HEADACHES: 0
PALPITATIONS: 0
SHORTNESS OF BREATH: 0
HEMATOCHEZIA: 0
WEAKNESS: 0
EYE PAIN: 0
ARTHRALGIAS: 0
MYALGIAS: 0
CONSTIPATION: 0

## 2021-11-23 ASSESSMENT — ACTIVITIES OF DAILY LIVING (ADL)
CURRENT_FUNCTION: MEDICATION ADMINISTRATION REQUIRES ASSISTANCE
CURRENT_FUNCTION: TELEPHONE REQUIRES ASSISTANCE
CURRENT_FUNCTION: LAUNDRY REQUIRES ASSISTANCE
CURRENT_FUNCTION: PREPARING MEALS REQUIRES ASSISTANCE
CURRENT_FUNCTION: TRANSPORTATION REQUIRES ASSISTANCE
CURRENT_FUNCTION: MONEY MANAGEMENT REQUIRES ASSISTANCE
CURRENT_FUNCTION: SHOPPING REQUIRES ASSISTANCE

## 2021-11-23 ASSESSMENT — MIFFLIN-ST. JEOR: SCORE: 1094.52

## 2021-11-23 NOTE — PROGRESS NOTES
Assessment & Plan     Afia was seen today for new patient.    Diagnoses and all orders for this visit:    Patient came with her  who was answering most of the questions  Patient was able to answer some questions but she said her memory was fair  Here to get established    Dementia without behavioral disturbance, unspecified dementia type (H)  -     donepezil (ARICEPT) 5 MG tablet; Take 1 tablet (5 mg) by mouth daily  Per patient's  she got diagnosed with this few years ago  She quit working in 2017  They noticed some issues prior to that  She quit driving to  Finances has been managed by her  for a long time  She was a patient of Dr. Teagan Tang who is memory care specialist  Her note was reviewed  Aricept and Lexapro has been very helpful medication  She lives with her  who is helping to manage her  Patient is able to take shower and take care of her hygiene  She needs assistance with food      Anxiety  -     escitalopram (LEXAPRO) 10 MG tablet; Take 1 tablet (10 mg) by mouth daily  Lexapro is working well for her anxiety  She is very calm and quiet    Encounter to establish care  He is here to get established    Osteoporosis, unspecified osteoporosis type, unspecified pathological fracture presence  -     DX Hip/Pelvis/Spine; Future  She has been on Fosamax for a while  I reviewed the note of Dr. Tang  I have ordered bone density  Based on the results we can think about switching to Prolia    Advanced directives, counseling/discussion  I told him to bring that copy to clinic  It seems like she is DNR/DNI but only if there is no hope  If something can be managed and there is a chance of recovery then it should be tried    Screening for thyroid disorder  -     TSH with free T4 reflex; Future  -     TSH with free T4 reflex    Screening for deficiency anemia  -     CBC with platelets; Future  -     Vitamin B12; Future  -     Ferritin; Future  -     CBC with platelets  -     Vitamin  "B12  -     Ferritin    Screening for lipid disorders  -     Cancel: Lipid panel reflex to direct LDL Fasting; Future  -     Lipid panel reflex to direct LDL Non-fasting; Future  -     Lipid panel reflex to direct LDL Non-fasting    Medication management  -     Comprehensive metabolic panel; Future  -     Comprehensive metabolic panel    Other orders  -     REVIEW OF HEALTH MAINTENANCE PROTOCOL ORDERS    Ear wash was done by nursing staff       See Patient Instructions  Patient Instructions   You are due for mammogram and bone densit  Please call the following number to make appointment :  [phone]  It is located in [address]    Make appointment with  for follow up     Schedule wellness visit     Seek sooner medical attention if there is any worsening of symptoms or problems.        Return in about 2 months (around 1/23/2022) for wellness visit.    Natalie Rodriguez MD  [facility] ZABRINA Oreilly is a 76 year old who presents for the following health issues     Healthy Habits:     In general, how would you rate your overall health?  Good    Frequency of exercise:  2-3 days/week    Duration of exercise:  15-30 minutes    Do you usually eat at least 4 servings of fruit and vegetables a day, include whole grains    & fiber and avoid regularly eating high fat or \"junk\" foods?  Yes    Taking medications regularly:  Yes    Medication side effects:  None    Ability to successfully perform activities of daily living:  Telephone requires assistance, transportation requires assistance, shopping requires assistance, preparing meals requires assistance, laundry requires assistance, medication administration requires assistance and money management requires assistance    Home Safety:  No safety concerns identified    Hearing Impairment:  Difficulty following dialogue in the theater and difficult to understand a speaker at a public meeting or [employer] service    In the past 6 months, have " "you been bothered by leaking of urine?  No    In general, how would you rate your overall mental or emotional health?  Fair      PHQ-2 Total Score: 0    Additional concerns today:  No     New Patient      Review of Systems   Constitutional: Negative for chills and fever.   HENT: Positive for hearing loss. Negative for congestion, ear pain and sore throat.    Eyes: Negative for pain and visual disturbance.   Respiratory: Negative for cough and shortness of breath.    Cardiovascular: Negative for chest pain, palpitations and peripheral edema.   Gastrointestinal: Negative for abdominal pain, constipation, diarrhea, hematochezia and nausea.   Breasts:  Negative for tenderness, breast mass and discharge.   Genitourinary: Negative for dysuria, frequency, genital sores, hematuria, pelvic pain, urgency, vaginal bleeding and vaginal discharge.   Musculoskeletal: Negative for arthralgias, joint swelling and myalgias.   Skin: Negative for rash.   Neurological: Negative for dizziness, weakness, headaches and paresthesias.   Psychiatric/Behavioral: Negative for mood changes. The patient is nervous/anxious.       She quit job in 2017 due to her memory issues  Quit driving in 2019  Quit writing checks several years ago    Objective    /74 (BP Location: Other (Comment), Patient Position: Chair, Cuff Size: Adult Regular)   Pulse 61   Temp 97.1  F (36.2  C) (Temporal)   Resp 16   Ht 1.607 m (5' 3.25\")   Wt 62.1 kg (137 lb)   LMP  (LMP Unknown)   SpO2 100%   BMI 24.08 kg/m    Body mass index is 24.08 kg/m .  Physical Exam       GENERAL APPEARANCE: healthy, alert and no distress  EYES: Eyes grossly normal to inspection, PERRL and conjunctivae and sclerae normal  HENT: ear canals and TM's normal on right side and there was cerumen on the left side and nose and mouth without ulcers or lesions  NECK: no adenopathy  RESP: lungs clear to auscultation - no rales, rhonchi or wheezes  CV: regular rates and rhythm, normal S1 S2, " no S3  Patient has dementia  Memory is fair  Her overall hygiene is good  She is able to answer some questions    She wears hearing aid    Ear wash was done by the nursing staff    40 minutes spent on the date of the encounter doing chart review, history and exam, documentation and further activities as noted above      Disclaimer: This note consists of symbols derived from keyboarding, dictation and/or voice recognition software. As a result, there may be errors in the script that have gone undetected. Please consider this when interpreting information found in this chart.

## 2021-11-23 NOTE — PATIENT INSTRUCTIONS
You are due for mammogram and bone densit  Please call the following number to make appointment :  [phone]  It is located in [address]    Make appointment with  for follow up     Schedule wellness visit     Seek sooner medical attention if there is any worsening of symptoms or problems.

## 2021-11-24 LAB
ALBUMIN SERPL-MCNC: 4 G/DL (ref 3.4–5)
ALP SERPL-CCNC: 99 U/L (ref 40–150)
ALT SERPL W P-5'-P-CCNC: 18 U/L (ref 0–50)
ANION GAP SERPL CALCULATED.3IONS-SCNC: 4 MMOL/L (ref 3–14)
AST SERPL W P-5'-P-CCNC: 16 U/L (ref 0–45)
BILIRUB SERPL-MCNC: 0.3 MG/DL (ref 0.2–1.3)
BUN SERPL-MCNC: 16 MG/DL (ref 7–30)
CALCIUM SERPL-MCNC: 9.3 MG/DL (ref 8.5–10.1)
CHLORIDE BLD-SCNC: 106 MMOL/L (ref 94–109)
CHOLEST SERPL-MCNC: 227 MG/DL
CO2 SERPL-SCNC: 29 MMOL/L (ref 20–32)
CREAT SERPL-MCNC: 0.68 MG/DL (ref 0.52–1.04)
FASTING STATUS PATIENT QL REPORTED: NO
FERRITIN SERPL-MCNC: 87 NG/ML (ref 8–252)
GFR SERPL CREATININE-BSD FRML MDRD: 86 ML/MIN/1.73M2
GLUCOSE BLD-MCNC: 92 MG/DL (ref 70–99)
HDLC SERPL-MCNC: 57 MG/DL
LDLC SERPL CALC-MCNC: 151 MG/DL
NONHDLC SERPL-MCNC: 170 MG/DL
POTASSIUM BLD-SCNC: 4.4 MMOL/L (ref 3.4–5.3)
PROT SERPL-MCNC: 7.5 G/DL (ref 6.8–8.8)
SODIUM SERPL-SCNC: 139 MMOL/L (ref 133–144)
TRIGL SERPL-MCNC: 93 MG/DL
TSH SERPL DL<=0.005 MIU/L-ACNC: 2.02 MU/L (ref 0.4–4)
VIT B12 SERPL-MCNC: 834 PG/ML (ref 193–986)

## 2021-11-24 NOTE — RESULT ENCOUNTER NOTE
Valeria Oreilly,    This is to inform you regarding your test result.    CBC result which includes white count Hemoglobin and  Platelet Counts is normal.   Other test results are pending.          Sincerely,      Dr.Nasima Michael MD,FACP

## 2021-11-26 NOTE — RESULT ENCOUNTER NOTE
Valeria Oreilly,    This is to inform you regarding your test result.    TSH which is thyroid hormone is normal.  The testing of your blood sugar, kidney function, liver function and electrolytes was normal.  Ferritin which is iron stores in the body is normal.  Your total cholesterol is elevated.  HDL which is called good cholesterol is normal.  Your LDL which is called bad cholesterol is elevated.  Eat low cholesterol low fat  diet and do regular physical activity.  Vitamin B 12 level is normal.  CBC result which includes white count Hemoglobin and  Platelet Counts is normal.       Sincerely,      Dr.Nasima Michael MD,FACP

## 2021-12-21 ENCOUNTER — HOSPITAL ENCOUNTER (OUTPATIENT)
Dept: BONE DENSITY | Facility: CLINIC | Age: 76
End: 2021-12-21
Attending: INTERNAL MEDICINE
Payer: COMMERCIAL

## 2021-12-21 ENCOUNTER — HOSPITAL ENCOUNTER (OUTPATIENT)
Dept: MAMMOGRAPHY | Facility: CLINIC | Age: 76
End: 2021-12-21
Attending: INTERNAL MEDICINE
Payer: COMMERCIAL

## 2021-12-21 DIAGNOSIS — Z12.31 VISIT FOR SCREENING MAMMOGRAM: ICD-10-CM

## 2021-12-21 DIAGNOSIS — M81.0 OSTEOPOROSIS, UNSPECIFIED OSTEOPOROSIS TYPE, UNSPECIFIED PATHOLOGICAL FRACTURE PRESENCE: ICD-10-CM

## 2021-12-21 PROCEDURE — 77067 SCR MAMMO BI INCL CAD: CPT

## 2021-12-21 PROCEDURE — 77080 DXA BONE DENSITY AXIAL: CPT

## 2021-12-22 NOTE — RESULT ENCOUNTER NOTE
Valeria Oreilly,    This is to inform you regarding your test result.    Mammogram result is satisfactory .  Recommendation: annual mammography      Sincerely,      Dr.Nasima Michael MD,FACP

## 2022-01-01 ENCOUNTER — NURSING HOME VISIT (OUTPATIENT)
Dept: GERIATRICS | Facility: CLINIC | Age: 77
End: 2022-01-01
Payer: COMMERCIAL

## 2022-01-01 ENCOUNTER — ASSISTED LIVING VISIT (OUTPATIENT)
Dept: GERIATRICS | Facility: CLINIC | Age: 77
End: 2022-01-01
Payer: COMMERCIAL

## 2022-01-01 ENCOUNTER — TELEPHONE (OUTPATIENT)
Dept: GERIATRICS | Facility: CLINIC | Age: 77
End: 2022-01-01

## 2022-01-01 ENCOUNTER — DOCUMENTATION ONLY (OUTPATIENT)
Dept: OTHER | Facility: CLINIC | Age: 77
End: 2022-01-01

## 2022-01-01 ENCOUNTER — MEDICAL CORRESPONDENCE (OUTPATIENT)
Dept: HEALTH INFORMATION MANAGEMENT | Facility: CLINIC | Age: 77
End: 2022-01-01

## 2022-01-01 ENCOUNTER — OFFICE VISIT (OUTPATIENT)
Dept: FAMILY MEDICINE | Facility: CLINIC | Age: 77
End: 2022-01-01
Payer: COMMERCIAL

## 2022-01-01 ENCOUNTER — DISCHARGE SUMMARY NURSING HOME (OUTPATIENT)
Dept: GERIATRICS | Facility: CLINIC | Age: 77
End: 2022-01-01
Payer: COMMERCIAL

## 2022-01-01 ENCOUNTER — TELEPHONE (OUTPATIENT)
Dept: FAMILY MEDICINE | Facility: CLINIC | Age: 77
End: 2022-01-01

## 2022-01-01 ENCOUNTER — HEALTH MAINTENANCE LETTER (OUTPATIENT)
Age: 77
End: 2022-01-01

## 2022-01-01 ENCOUNTER — MEDICAL CORRESPONDENCE (OUTPATIENT)
Dept: FAMILY MEDICINE | Facility: CLINIC | Age: 77
End: 2022-01-01

## 2022-01-01 VITALS
OXYGEN SATURATION: 95 % | WEIGHT: 132 LBS | HEIGHT: 63 IN | BODY MASS INDEX: 23.39 KG/M2 | SYSTOLIC BLOOD PRESSURE: 114 MMHG | TEMPERATURE: 97.3 F | DIASTOLIC BLOOD PRESSURE: 70 MMHG | HEART RATE: 55 BPM | RESPIRATION RATE: 16 BRPM

## 2022-01-01 VITALS
BODY MASS INDEX: 10.61 KG/M2 | HEART RATE: 55 BPM | DIASTOLIC BLOOD PRESSURE: 70 MMHG | OXYGEN SATURATION: 95 % | HEIGHT: 63 IN | TEMPERATURE: 97.3 F | SYSTOLIC BLOOD PRESSURE: 114 MMHG | RESPIRATION RATE: 16 BRPM | WEIGHT: 59.9 LBS

## 2022-01-01 VITALS
WEIGHT: 130.2 LBS | SYSTOLIC BLOOD PRESSURE: 124 MMHG | BODY MASS INDEX: 25.56 KG/M2 | HEIGHT: 60 IN | TEMPERATURE: 97.9 F | RESPIRATION RATE: 18 BRPM | DIASTOLIC BLOOD PRESSURE: 76 MMHG | HEART RATE: 79 BPM | OXYGEN SATURATION: 98 %

## 2022-01-01 VITALS
DIASTOLIC BLOOD PRESSURE: 75 MMHG | RESPIRATION RATE: 16 BRPM | HEIGHT: 63 IN | OXYGEN SATURATION: 98 % | SYSTOLIC BLOOD PRESSURE: 128 MMHG | HEART RATE: 83 BPM | TEMPERATURE: 98 F | BODY MASS INDEX: 23.04 KG/M2 | WEIGHT: 130 LBS

## 2022-01-01 VITALS
DIASTOLIC BLOOD PRESSURE: 76 MMHG | TEMPERATURE: 97.9 F | HEART RATE: 79 BPM | RESPIRATION RATE: 18 BRPM | HEIGHT: 60 IN | SYSTOLIC BLOOD PRESSURE: 124 MMHG | OXYGEN SATURATION: 98 % | WEIGHT: 130.3 LBS | BODY MASS INDEX: 25.58 KG/M2

## 2022-01-01 VITALS
DIASTOLIC BLOOD PRESSURE: 78 MMHG | RESPIRATION RATE: 18 BRPM | TEMPERATURE: 98 F | OXYGEN SATURATION: 95 % | BODY MASS INDEX: 25.39 KG/M2 | SYSTOLIC BLOOD PRESSURE: 126 MMHG | HEART RATE: 76 BPM | WEIGHT: 130 LBS

## 2022-01-01 VITALS
BODY MASS INDEX: 23.04 KG/M2 | DIASTOLIC BLOOD PRESSURE: 76 MMHG | TEMPERATURE: 97.6 F | HEART RATE: 67 BPM | OXYGEN SATURATION: 96 % | RESPIRATION RATE: 18 BRPM | WEIGHT: 130 LBS | HEIGHT: 63 IN | SYSTOLIC BLOOD PRESSURE: 116 MMHG

## 2022-01-01 VITALS
DIASTOLIC BLOOD PRESSURE: 60 MMHG | OXYGEN SATURATION: 96 % | TEMPERATURE: 97.7 F | BODY MASS INDEX: 24.81 KG/M2 | SYSTOLIC BLOOD PRESSURE: 118 MMHG | WEIGHT: 126.4 LBS | HEIGHT: 60 IN | RESPIRATION RATE: 17 BRPM | HEART RATE: 65 BPM

## 2022-01-01 VITALS
OXYGEN SATURATION: 98 % | SYSTOLIC BLOOD PRESSURE: 129 MMHG | HEART RATE: 74 BPM | HEIGHT: 63 IN | WEIGHT: 126.8 LBS | TEMPERATURE: 98.3 F | BODY MASS INDEX: 22.47 KG/M2 | DIASTOLIC BLOOD PRESSURE: 77 MMHG | RESPIRATION RATE: 18 BRPM

## 2022-01-01 VITALS
BODY MASS INDEX: 22.47 KG/M2 | DIASTOLIC BLOOD PRESSURE: 77 MMHG | HEART RATE: 74 BPM | SYSTOLIC BLOOD PRESSURE: 129 MMHG | WEIGHT: 126.8 LBS | RESPIRATION RATE: 18 BRPM | TEMPERATURE: 98.3 F | HEIGHT: 63 IN

## 2022-01-01 VITALS
SYSTOLIC BLOOD PRESSURE: 110 MMHG | OXYGEN SATURATION: 98 % | RESPIRATION RATE: 16 BRPM | DIASTOLIC BLOOD PRESSURE: 78 MMHG | WEIGHT: 128 LBS | BODY MASS INDEX: 25.13 KG/M2 | HEIGHT: 60 IN | TEMPERATURE: 98 F | HEART RATE: 73 BPM

## 2022-01-01 DIAGNOSIS — M81.8 OTHER OSTEOPOROSIS WITHOUT CURRENT PATHOLOGICAL FRACTURE: ICD-10-CM

## 2022-01-01 DIAGNOSIS — F32.0 CURRENT MILD EPISODE OF MAJOR DEPRESSIVE DISORDER WITHOUT PRIOR EPISODE (H): Primary | ICD-10-CM

## 2022-01-01 DIAGNOSIS — F41.9 ANXIETY: ICD-10-CM

## 2022-01-01 DIAGNOSIS — K59.01 SLOW TRANSIT CONSTIPATION: ICD-10-CM

## 2022-01-01 DIAGNOSIS — F02.80 ALZHEIMER'S DEMENTIA WITHOUT BEHAVIORAL DISTURBANCE (H): ICD-10-CM

## 2022-01-01 DIAGNOSIS — Z78.9 POLST (PHYSICIAN ORDERS FOR LIFE-SUSTAINING TREATMENT): ICD-10-CM

## 2022-01-01 DIAGNOSIS — K59.00 CONSTIPATION, UNSPECIFIED CONSTIPATION TYPE: ICD-10-CM

## 2022-01-01 DIAGNOSIS — F02.C11 SEVERE ALZHEIMER'S DEMENTIA WITH AGITATION, UNSPECIFIED TIMING OF DEMENTIA ONSET (H): ICD-10-CM

## 2022-01-01 DIAGNOSIS — F03.90 SENILE DEMENTIA, UNCOMPLICATED (H): Primary | ICD-10-CM

## 2022-01-01 DIAGNOSIS — E55.9 VITAMIN D DEFICIENCY: ICD-10-CM

## 2022-01-01 DIAGNOSIS — R45.1 AGITATION: Primary | ICD-10-CM

## 2022-01-01 DIAGNOSIS — F03.90 DEMENTIA WITHOUT BEHAVIORAL DISTURBANCE (H): Primary | ICD-10-CM

## 2022-01-01 DIAGNOSIS — M81.0 AGE-RELATED OSTEOPOROSIS WITHOUT CURRENT PATHOLOGICAL FRACTURE: Primary | Chronic | ICD-10-CM

## 2022-01-01 DIAGNOSIS — G30.9 SEVERE ALZHEIMER'S DEMENTIA WITH AGITATION, UNSPECIFIED TIMING OF DEMENTIA ONSET (H): ICD-10-CM

## 2022-01-01 DIAGNOSIS — F02.C18 SEVERE ALZHEIMER'S DEMENTIA WITH OTHER BEHAVIORAL DISTURBANCE, UNSPECIFIED TIMING OF DEMENTIA ONSET (H): Primary | ICD-10-CM

## 2022-01-01 DIAGNOSIS — F32.0 CURRENT MILD EPISODE OF MAJOR DEPRESSIVE DISORDER WITHOUT PRIOR EPISODE (H): ICD-10-CM

## 2022-01-01 DIAGNOSIS — M81.0 AGE-RELATED OSTEOPOROSIS WITHOUT CURRENT PATHOLOGICAL FRACTURE: Chronic | ICD-10-CM

## 2022-01-01 DIAGNOSIS — F03.90 DEMENTIA WITHOUT BEHAVIORAL DISTURBANCE (H): ICD-10-CM

## 2022-01-01 DIAGNOSIS — Z85.42 PERSONAL HISTORY OF MALIGNANT NEOPLASM OF OTHER PARTS OF UTERUS: ICD-10-CM

## 2022-01-01 DIAGNOSIS — G30.9 SEVERE ALZHEIMER'S DEMENTIA WITH OTHER BEHAVIORAL DISTURBANCE, UNSPECIFIED TIMING OF DEMENTIA ONSET (H): Primary | ICD-10-CM

## 2022-01-01 DIAGNOSIS — F03.90 SENILE DEMENTIA, UNCOMPLICATED (H): ICD-10-CM

## 2022-01-01 DIAGNOSIS — E78.00 PURE HYPERCHOLESTEROLEMIA: ICD-10-CM

## 2022-01-01 DIAGNOSIS — E55.9 VITAMIN D DEFICIENCY DISEASE: ICD-10-CM

## 2022-01-01 DIAGNOSIS — M81.0 AGE-RELATED OSTEOPOROSIS WITHOUT CURRENT PATHOLOGICAL FRACTURE: ICD-10-CM

## 2022-01-01 DIAGNOSIS — G30.9 SEVERE ALZHEIMER'S DEMENTIA WITH OTHER BEHAVIORAL DISTURBANCE, UNSPECIFIED TIMING OF DEMENTIA ONSET (H): ICD-10-CM

## 2022-01-01 DIAGNOSIS — R46.89 WANDERING: ICD-10-CM

## 2022-01-01 DIAGNOSIS — G30.9 ALZHEIMER'S DEMENTIA WITHOUT BEHAVIORAL DISTURBANCE (H): ICD-10-CM

## 2022-01-01 DIAGNOSIS — H91.90 HEARING LOSS, UNSPECIFIED HEARING LOSS TYPE, UNSPECIFIED LATERALITY: Primary | Chronic | ICD-10-CM

## 2022-01-01 DIAGNOSIS — F02.C18 SEVERE ALZHEIMER'S DEMENTIA WITH OTHER BEHAVIORAL DISTURBANCE, UNSPECIFIED TIMING OF DEMENTIA ONSET (H): ICD-10-CM

## 2022-01-01 DIAGNOSIS — F03.90 DEMENTIA WITHOUT BEHAVIORAL DISTURBANCE, UNSPECIFIED DEMENTIA TYPE: Primary | ICD-10-CM

## 2022-01-01 DIAGNOSIS — Z71.89 ACP (ADVANCE CARE PLANNING): ICD-10-CM

## 2022-01-01 DIAGNOSIS — F41.9 ANXIETY: Primary | ICD-10-CM

## 2022-01-01 PROCEDURE — 99309 SBSQ NF CARE MODERATE MDM 30: CPT | Performed by: NURSE PRACTITIONER

## 2022-01-01 PROCEDURE — 99305 1ST NF CARE MODERATE MDM 35: CPT | Performed by: INTERNAL MEDICINE

## 2022-01-01 PROCEDURE — 99310 SBSQ NF CARE HIGH MDM 45: CPT | Performed by: NURSE PRACTITIONER

## 2022-01-01 PROCEDURE — 99214 OFFICE O/P EST MOD 30 MIN: CPT | Performed by: INTERNAL MEDICINE

## 2022-01-01 PROCEDURE — 99316 NF DSCHRG MGMT 30 MIN+: CPT | Performed by: NURSE PRACTITIONER

## 2022-01-01 RX ORDER — HYDROXYZINE HYDROCHLORIDE 25 MG/1
12.5 TABLET, FILM COATED ORAL 2 TIMES DAILY
Start: 2022-01-01 | End: 2022-01-01

## 2022-01-01 RX ORDER — POLYETHYLENE GLYCOL 3350 17 G/17G
17 POWDER, FOR SOLUTION ORAL EVERY OTHER DAY
Qty: 510 G
Start: 2022-01-01

## 2022-01-01 RX ORDER — CITALOPRAM HYDROBROMIDE 20 MG/10ML
5 SOLUTION, ORAL ORAL DAILY
COMMUNITY
End: 2022-01-01

## 2022-01-01 RX ORDER — HYDROXYZINE HYDROCHLORIDE 25 MG/1
12.5 TABLET, FILM COATED ORAL 2 TIMES DAILY PRN
Start: 2022-01-01 | End: 2022-01-01

## 2022-01-01 RX ORDER — CITALOPRAM HYDROBROMIDE 20 MG/10ML
10 SOLUTION, ORAL ORAL DAILY
Start: 2022-01-01 | End: 2022-01-01

## 2022-01-01 RX ORDER — ESCITALOPRAM OXALATE 10 MG/1
10 TABLET ORAL DAILY
Qty: 90 TABLET | Refills: 3 | Status: SHIPPED | OUTPATIENT
Start: 2022-01-01 | End: 2022-01-01

## 2022-01-01 RX ORDER — POLYETHYLENE GLYCOL 3350 17 G/17G
1 POWDER, FOR SOLUTION ORAL DAILY
Start: 2022-01-01 | End: 2022-01-01

## 2022-01-01 RX ORDER — ESCITALOPRAM OXALATE 10 MG/1
20 TABLET ORAL DAILY
Qty: 90 TABLET | Refills: 3
Start: 2022-01-01 | End: 2022-01-01

## 2022-01-01 RX ORDER — CITALOPRAM HYDROBROMIDE 10 MG/1
10 TABLET ORAL DAILY
COMMUNITY
End: 2023-01-01

## 2022-01-01 RX ORDER — CHOLECALCIFEROL (VITAMIN D3) 50 MCG
1 TABLET ORAL
COMMUNITY
Start: 2022-01-01

## 2022-01-01 RX ORDER — RISPERIDONE 0.25 MG/1
0.25 TABLET ORAL 2 TIMES DAILY
Start: 2022-01-01 | End: 2023-01-01

## 2022-01-01 RX ORDER — DONEPEZIL HYDROCHLORIDE 5 MG/1
5 TABLET, FILM COATED ORAL DAILY
Qty: 90 TABLET | Refills: 3 | Status: SHIPPED | OUTPATIENT
Start: 2022-01-01 | End: 2022-01-01

## 2022-01-13 ENCOUNTER — TELEPHONE (OUTPATIENT)
Dept: FAMILY MEDICINE | Facility: CLINIC | Age: 77
End: 2022-01-13
Payer: COMMERCIAL

## 2022-01-13 DIAGNOSIS — F41.9 ANXIETY: ICD-10-CM

## 2022-01-13 RX ORDER — ESCITALOPRAM OXALATE 10 MG/1
10 TABLET ORAL DAILY
Qty: 10 TABLET | Refills: 0 | Status: SHIPPED | OUTPATIENT
Start: 2022-01-13 | End: 2022-01-01

## 2022-01-13 NOTE — TELEPHONE ENCOUNTER
Patient  calling, Sukhjinder C2C, requesting temporary supply of escitalopram. Patient typically gets medication through [facility] but has not received medication. Per Sukhjinder, they have contacted [facility] and the medication should be coming this week. Patient is currently out of the medication. Writer ordered temporary supply to local pharmacy.    Callback: [phone]- okay to leave detailed VM    Mame Khoury RN  [facility]

## 2022-01-24 ENCOUNTER — TELEPHONE (OUTPATIENT)
Dept: FAMILY MEDICINE | Facility: CLINIC | Age: 77
End: 2022-01-24

## 2022-01-24 ENCOUNTER — OFFICE VISIT (OUTPATIENT)
Dept: FAMILY MEDICINE | Facility: CLINIC | Age: 77
End: 2022-01-24
Payer: COMMERCIAL

## 2022-01-24 VITALS
OXYGEN SATURATION: 99 % | HEIGHT: 63 IN | RESPIRATION RATE: 16 BRPM | BODY MASS INDEX: 25.09 KG/M2 | DIASTOLIC BLOOD PRESSURE: 69 MMHG | TEMPERATURE: 97.6 F | SYSTOLIC BLOOD PRESSURE: 119 MMHG | WEIGHT: 141.6 LBS | HEART RATE: 57 BPM

## 2022-01-24 DIAGNOSIS — M81.0 AGE-RELATED OSTEOPOROSIS WITHOUT CURRENT PATHOLOGICAL FRACTURE: ICD-10-CM

## 2022-01-24 DIAGNOSIS — C54.1 PRIMARY MALIGNANT NEOPLASM OF ENDOMETRIUM (H): ICD-10-CM

## 2022-01-24 DIAGNOSIS — E78.5 HYPERLIPIDEMIA, UNSPECIFIED HYPERLIPIDEMIA TYPE: ICD-10-CM

## 2022-01-24 DIAGNOSIS — Z00.00 ENCOUNTER FOR MEDICARE ANNUAL WELLNESS EXAM: Primary | ICD-10-CM

## 2022-01-24 DIAGNOSIS — F03.90 DEMENTIA WITHOUT BEHAVIORAL DISTURBANCE, UNSPECIFIED DEMENTIA TYPE: ICD-10-CM

## 2022-01-24 DIAGNOSIS — Z12.11 SCREEN FOR COLON CANCER: ICD-10-CM

## 2022-01-24 DIAGNOSIS — Z92.29 HISTORY OF BISPHOSPHONATE THERAPY: ICD-10-CM

## 2022-01-24 PROCEDURE — 99397 PER PM REEVAL EST PAT 65+ YR: CPT | Performed by: INTERNAL MEDICINE

## 2022-01-24 PROCEDURE — 99213 OFFICE O/P EST LOW 20 MIN: CPT | Mod: 25 | Performed by: INTERNAL MEDICINE

## 2022-01-24 RX ORDER — ALBUTEROL SULFATE 0.83 MG/ML
2.5 SOLUTION RESPIRATORY (INHALATION)
Status: CANCELLED | OUTPATIENT
Start: 2022-01-24

## 2022-01-24 RX ORDER — ALBUTEROL SULFATE 90 UG/1
1-2 AEROSOL, METERED RESPIRATORY (INHALATION)
Status: CANCELLED
Start: 2022-01-24

## 2022-01-24 RX ORDER — ZOLEDRONIC ACID 5 MG/100ML
5 INJECTION, SOLUTION INTRAVENOUS ONCE
Status: CANCELLED
Start: 2022-01-24 | End: 2022-01-24

## 2022-01-24 RX ORDER — METHYLPREDNISOLONE SODIUM SUCCINATE 125 MG/2ML
125 INJECTION, POWDER, LYOPHILIZED, FOR SOLUTION INTRAMUSCULAR; INTRAVENOUS
Status: CANCELLED
Start: 2022-01-24

## 2022-01-24 RX ORDER — HEPARIN SODIUM (PORCINE) LOCK FLUSH IV SOLN 100 UNIT/ML 100 UNIT/ML
5 SOLUTION INTRAVENOUS
Status: CANCELLED | OUTPATIENT
Start: 2022-01-24

## 2022-01-24 RX ORDER — NALOXONE HYDROCHLORIDE 0.4 MG/ML
0.2 INJECTION, SOLUTION INTRAMUSCULAR; INTRAVENOUS; SUBCUTANEOUS
Status: CANCELLED | OUTPATIENT
Start: 2022-01-24

## 2022-01-24 RX ORDER — HEPARIN SODIUM,PORCINE 10 UNIT/ML
5 VIAL (ML) INTRAVENOUS
Status: CANCELLED | OUTPATIENT
Start: 2022-01-24

## 2022-01-24 RX ORDER — DIPHENHYDRAMINE HYDROCHLORIDE 50 MG/ML
50 INJECTION INTRAMUSCULAR; INTRAVENOUS
Status: CANCELLED
Start: 2022-01-24

## 2022-01-24 RX ORDER — EPINEPHRINE 1 MG/ML
0.3 INJECTION, SOLUTION, CONCENTRATE INTRAVENOUS EVERY 5 MIN PRN
Status: CANCELLED | OUTPATIENT
Start: 2022-01-24

## 2022-01-24 RX ORDER — MEPERIDINE HYDROCHLORIDE 25 MG/ML
25 INJECTION INTRAMUSCULAR; INTRAVENOUS; SUBCUTANEOUS EVERY 30 MIN PRN
Status: CANCELLED | OUTPATIENT
Start: 2022-01-24

## 2022-01-24 ASSESSMENT — ENCOUNTER SYMPTOMS: BREAST MASS: 0

## 2022-01-24 ASSESSMENT — ACTIVITIES OF DAILY LIVING (ADL)
CURRENT_FUNCTION: SHOPPING REQUIRES ASSISTANCE
CURRENT_FUNCTION: TRANSPORTATION REQUIRES ASSISTANCE
CURRENT_FUNCTION: LAUNDRY REQUIRES ASSISTANCE
CURRENT_FUNCTION: PREPARING MEALS REQUIRES ASSISTANCE
CURRENT_FUNCTION: TELEPHONE REQUIRES ASSISTANCE
CURRENT_FUNCTION: MEDICATION ADMINISTRATION REQUIRES ASSISTANCE
CURRENT_FUNCTION: MONEY MANAGEMENT REQUIRES ASSISTANCE

## 2022-01-24 ASSESSMENT — MIFFLIN-ST. JEOR: SCORE: 1115.38

## 2022-01-24 NOTE — PATIENT INSTRUCTIONS
Discontinue fosamax  I have ordered IV Reclast  Due for second shot of shingrix  Continue calcium and vit d   Follow up in 6 months  Seek sooner medical attention if there is any worsening of symptoms or problems.        Patient Education   Personalized Prevention Plan  You are due for the preventive services outlined below.  Your care team is available to assist you in scheduling these services.  If you have already completed any of these items, please share that information with your care team to update in your medical record.  Health Maintenance Due   Topic Date Due     Zoster (Shingles) Vaccine (3 of 3) 12/09/2021     Preventive Health Recommendations    See your health care provider every year to    Review health changes.     Discuss preventive care.      Review your medicines if your doctor has prescribed any.    You no longer need a yearly Pap test unless you've had an abnormal Pap test in the past 10 years. If you have vaginal symptoms, such as bleeding or discharge, be sure to talk with your provider about a Pap test.    Every 1 to 2 years, have a mammogram.  If you are over 69, talk with your health care provider about whether or not you want to continue having screening mammograms.    Every 10 years, have a colonoscopy. Or, have a yearly FIT test (stool test). These exams will check for colon cancer.     Have a cholesterol test every 5 years, or more often if your doctor advises it.     Have a diabetes test (fasting glucose) every three years. If you are at risk for diabetes, you should have this test more often.     At age 65, have a bone density scan (DEXA) to check for osteoporosis (brittle bone disease).    Shots:    Get a flu shot each year.    Get a tetanus shot every 10 years.    Talk to your doctor about your pneumonia vaccines. There are now two you should receive - Pneumovax (PPSV 23) and Prevnar (PCV 13).    Talk to your pharmacist about the shingles vaccine.    Talk to your doctor about the  hepatitis B vaccine.    Nutrition:     Eat at least 5 servings of fruits and vegetables each day.    Eat whole-grain bread, whole-wheat pasta and brown rice instead of white grains and rice.    Get adequate Calcium and Vitamin D.     Lifestyle    Exercise at least 150 minutes a week (30 minutes a day, 5 days a week). This will help you control your weight and prevent disease.    Limit alcohol to one drink per day.    No smoking.     Wear sunscreen to prevent skin cancer.     See your dentist twice a year for an exam and cleaning.    See your eye doctor every 1 to 2 years to screen for conditions such as glaucoma, macular degeneration and cataracts.    Personalized Prevention Plan  You are due for the preventive services outlined below.  Your care team is available to assist you in scheduling these services.  If you have already completed any of these items, please share that information with your care team to update in your medical record.  Health Maintenance   Topic Date Due     ZOSTER IMMUNIZATION (3 of 3) 12/09/2021     ANNUAL REVIEW OF HM ORDERS  11/23/2022     FALL RISK ASSESSMENT  11/23/2022     MEDICARE ANNUAL WELLNESS VISIT  01/24/2023     MAMMO SCREENING  12/21/2023     LIPID  11/23/2026     ADVANCE CARE PLANNING  01/24/2027     DTAP/TDAP/TD IMMUNIZATION (3 - Td or Tdap) 04/03/2030     DEXA  12/21/2036     HEPATITIS C SCREENING  Completed     PHQ-2  Completed     INFLUENZA VACCINE  Completed     Pneumococcal Vaccine: 65+ Years  Completed     COVID-19 Vaccine  Completed     IPV IMMUNIZATION  Aged Out     MENINGITIS IMMUNIZATION  Aged Out     HEPATITIS B IMMUNIZATION  Aged Out     COLORECTAL CANCER SCREENING  Discontinued     Activities of Daily Living    Your Health Risk Assessment indicates you have difficulties with activities of daily living such as housework, bathing, preparing meals, taking medication, etc. Please make a follow up appointment for us to address this issue in more detail.

## 2022-01-24 NOTE — PROGRESS NOTES
"SUBJECTIVE:   Afia Sylvester is a 76 year old female who presents for Preventive Visit.    Patient came with her   She has underlying dementia  She is retired RN    Patient has been advised of split billing requirements and indicates understanding: Yes  Are you in the first 12 months of your Medicare coverage?  No    Healthy Habits:     In general, how would you rate your overall health?  Good    Frequency of exercise:  2-3 days/week    Duration of exercise:  15-30 minutes    Do you usually eat at least 4 servings of fruit and vegetables a day, include whole grains    & fiber and avoid regularly eating high fat or \"junk\" foods?  Yes    Taking medications regularly:  Yes    Medication side effects:  None    Ability to successfully perform activities of daily living:  Telephone requires assistance, transportation requires assistance, shopping requires assistance, preparing meals requires assistance, laundry requires assistance, medication administration requires assistance and money management requires assistance    Home Safety:  No safety concerns identified    Hearing Impairment:  No hearing concerns    In the past 6 months, have you been bothered by leaking of urine?  No    In general, how would you rate your overall mental or emotional health?  Good      PHQ-2 Total Score: 0    Additional concerns today:  No    Do you feel safe in your environment? Yes    Have you ever done Advance Care Planning? (For example, a Health Directive, POLST, or a discussion with a medical provider or your loved ones about your wishes): Yes, advance care planning is on file.       Fall risk  Fallen 2 or more times in the past year?: No  Any fall with injury in the past year?: No    Cognitive Screening Not appropriate due to known dementia    Do you have sleep apnea, excessive snoring or daytime drowsiness?: no    Reviewed and updated as needed this visit by clinical staff  Tobacco  Allergies  Meds  Problems  Med Hx  Surg " Hx  Fam Hx  Soc Hx         Reviewed and updated as needed this visit by Provider   Allergies  Meds  Problems           Social History     Tobacco Use     Smoking status: Never Smoker     Smokeless tobacco: Never Used   Substance Use Topics     Alcohol use: Not Currently     Comment: none     If you drink alcohol do you typically have >3 drinks per day or >7 drinks per week? No    Alcohol Use 1/24/2022   Prescreen: >3 drinks/day or >7 drinks/week? No   Prescreen: >3 drinks/day or >7 drinks/week? -               Current providers sharing in care for this patient include:   Patient Care Team:  Natalie Rodriguez MD as PCP - General (Internal Medicine)  Natalie Rodriguez MD as Assigned PCP    The following health maintenance items are reviewed in Epic and correct as of today:  Health Maintenance Due   Topic Date Due     ZOSTER IMMUNIZATION (3 of 3) 12/09/2021           Review of Systems   HENT: Positive for hearing loss.    Breasts:  Negative for tenderness, breast mass and discharge.   Genitourinary: Negative for pelvic pain, vaginal bleeding and vaginal discharge.       Office Visit on 11/23/2021   Component Date Value Ref Range Status     WBC Count 11/23/2021 6.2  4.0 - 11.0 10e3/uL Final     RBC Count 11/23/2021 4.27  3.80 - 5.20 10e6/uL Final     Hemoglobin 11/23/2021 13.4  11.7 - 15.7 g/dL Final     Hematocrit 11/23/2021 40.9  35.0 - 47.0 % Final     MCV 11/23/2021 96  78 - 100 fL Final     MCH 11/23/2021 31.4  26.5 - 33.0 pg Final     MCHC 11/23/2021 32.8  31.5 - 36.5 g/dL Final     RDW 11/23/2021 13.4  10.0 - 15.0 % Final     Platelet Count 11/23/2021 328  150 - 450 10e3/uL Final     Sodium 11/23/2021 139  133 - 144 mmol/L Final     Potassium 11/23/2021 4.4  3.4 - 5.3 mmol/L Final     Chloride 11/23/2021 106  94 - 109 mmol/L Final     Carbon Dioxide (CO2) 11/23/2021 29  20 - 32 mmol/L Final     Anion Gap 11/23/2021 4  3 - 14 mmol/L Final     Urea Nitrogen 11/23/2021 16  7 - 30 mg/dL Final     Creatinine  "11/23/2021 0.68  0.52 - 1.04 mg/dL Final     Calcium 11/23/2021 9.3  8.5 - 10.1 mg/dL Final     Glucose 11/23/2021 92  70 - 99 mg/dL Final     Alkaline Phosphatase 11/23/2021 99  40 - 150 U/L Final     AST 11/23/2021 16  0 - 45 U/L Final     ALT 11/23/2021 18  0 - 50 U/L Final     Protein Total 11/23/2021 7.5  6.8 - 8.8 g/dL Final     Albumin 11/23/2021 4.0  3.4 - 5.0 g/dL Final     Bilirubin Total 11/23/2021 0.3  0.2 - 1.3 mg/dL Final     GFR Estimate 11/23/2021 86  >60 mL/min/1.73m2 Final    As of July 11, 2021, eGFR is calculated by the CKD-EPI creatinine equation, without race adjustment. eGFR can be influenced by muscle mass, exercise, and diet. The reported eGFR is an estimation only and is only applicable if the renal function is stable.     TSH 11/23/2021 2.02  0.40 - 4.00 mU/L Final     Vitamin B12 11/23/2021 834  193 - 986 pg/mL Final     Ferritin 11/23/2021 87  8 - 252 ng/mL Final     Cholesterol 11/23/2021 227* <200 mg/dL Final     Triglycerides 11/23/2021 93  <150 mg/dL Final     Direct Measure HDL 11/23/2021 57  >=50 mg/dL Final     LDL Cholesterol Calculated 11/23/2021 151* <=100 mg/dL Final     Non HDL Cholesterol 11/23/2021 170* <130 mg/dL Final     Patient Fasting > 8hrs? 11/23/2021 No   Final       OBJECTIVE:   /69 (BP Location: Right arm, Patient Position: Sitting, Cuff Size: Adult Regular)   Pulse 57   Temp 97.6  F (36.4  C) (Temporal)   Resp 16   Ht 1.607 m (5' 3.25\")   Wt 64.2 kg (141 lb 9.6 oz)   LMP  (LMP Unknown)   SpO2 99%   BMI 24.89 kg/m   Estimated body mass index is 24.89 kg/m  as calculated from the following:    Height as of this encounter: 1.607 m (5' 3.25\").    Weight as of this encounter: 64.2 kg (141 lb 9.6 oz).  Physical Exam      GENERAL APPEARANCE: healthy, alert and no distress  Memory is fair  EYES: Eyes grossly normal to inspection, PERRL and conjunctivae and sclerae normal  HENT: ear canals and TM's normal andnose and mouth without ulcers or lesions  Wears " "hearing aids on both sides   NECK: no adenopathy  RESP: lungs clear to auscultation - no rales, rhonchi or wheezes  CV: regular rates and rhythm, normal S1 S2, no S3  She has multiple seborrheic keratosis lesion on anterior and posterior part of the chest      ASSESSMENT / PLAN:   Afia was seen today for physical.    Diagnoses and all orders for this visit:    Encounter for Medicare annual wellness exam  Preventive health counseling was also done.  Patient had mammogram on December 21, 2021  She also had bone density at the same time  Did not have colonoscopy but agreed for the fit test  Patient is followed by [facility] clinic Dr. Teagan Tang    Screen for colon cancer  -     Fecal colorectal cancer screen (FIT); Future  -     Fecal colorectal cancer screen (FIT)    Dementia without behavioral disturbance, unspecified dementia type (H)  Stable  Lives with her   Follows Dr. Teagan Tang at [facility]    Age-related osteoporosis without current pathological fracture  History of taking Fosamax. due to dementia it becomes challenging  As bone density still shows osteoporosis I have ordered IV Reclast    History of bisphosphonate therapy  History of taking Fosamax for many years    Hyperlipidemia, unspecified hyperlipidemia type  Low-cholesterol low-fat diet    Primary malignant neoplasm of endometrium (H)  Comments:  past history    Doing well and she had a hysterectomy    Patient is due for second shot of Shingrix  Reminded her about that      Patient has been advised of split billing requirements and indicates understanding: Yes    COUNSELING:  Reviewed preventive health counseling, as reflected in patient instructions       Regular exercise       Healthy diet/nutrition       Osteoporosis prevention/bone health       Colon cancer screening    Estimated body mass index is 24.89 kg/m  as calculated from the following:    Height as of this encounter: 1.607 m (5' 3.25\").    Weight as of this encounter: 64.2 kg (141 " lb 9.6 oz).        She reports that she has never smoked. She has never used smokeless tobacco.      Appropriate preventive services were discussed with this patient, including applicable screening as appropriate for cardiovascular disease, diabetes, osteopenia/osteoporosis, and glaucoma.  As appropriate for age/gender, discussed screening for colorectal cancer, prostate cancer, breast cancer, and cervical cancer. Checklist reviewing preventive services available has been given to the patient.    Reviewed patients plan of care and provided an AVS. The Basic Care Plan (routine screening as documented in Health Maintenance) for Afia meets the Care Plan requirement. This Care Plan has been established and reviewed with the Patient.    Counseling Resources:  ATP IV Guidelines  Pooled Cohorts Equation Calculator  Breast Cancer Risk Calculator  Breast Cancer: Medication to Reduce Risk  FRAX Risk Assessment  ICSI Preventive Guidelines  Dietary Guidelines for Americans, 2010  USDA's MyPlate  ASA Prophylaxis  Lung CA Screening    Natalie Rodriguez MD  [facility]    Identified Health Risks:    The patient reports that she has difficulty with activities of daily living. I have asked that the patient make a follow up appointment in  weeks where this issue will be further evaluated and addressed.

## 2022-01-24 NOTE — TELEPHONE ENCOUNTER
Called patient and gave the [facility] number [phone] to schedule Reclast therapy infusion.  Provider has signed order.    Monica Ha RN

## 2022-01-26 PROCEDURE — 82274 ASSAY TEST FOR BLOOD FECAL: CPT | Performed by: INTERNAL MEDICINE

## 2022-01-27 ENCOUNTER — TELEPHONE (OUTPATIENT)
Dept: FAMILY MEDICINE | Facility: CLINIC | Age: 77
End: 2022-01-27
Payer: COMMERCIAL

## 2022-01-27 NOTE — TELEPHONE ENCOUNTER
Dr. Rodriguez,     Pt's  Edwin calling into report that he tried to scheduled pt for her Reclast infusion and the [facility] infusion therapy wouldn't scheduled due to last being older than 2 months.     Pt reports that Infusion Center at [phone] is needing an okay from you to proceed with infusion even through labs are older than 2 months.     Please call infusion center and give the okay. Pt's  would also like to be aware so he knows to call back and schedule or to have labs drawn for wife.      reporting that if pt needs labs redone they may consider not having the infusion    Can we leave a detailed message on this number? YES  Phone number patient can be reached at: Other phone number:  Edwin, [phone]    Roxy Lang RN  [facility] Triage

## 2022-01-27 NOTE — TELEPHONE ENCOUNTER
Called infusion center scheduling and notified them     Dahlia JAUREGUI, Triage RN  [facility] Internal Medicine [facility]

## 2022-01-27 NOTE — TELEPHONE ENCOUNTER
It is ok to proceed with infusion from my end   Labs were satisfactory on 11/21  Please let them know   Dr.Nasima Michael MD

## 2022-01-28 LAB — HEMOCCULT STL QL IA: NEGATIVE

## 2022-01-28 NOTE — RESULT ENCOUNTER NOTE
Valeria Oreilly,    This is to inform you regarding your test result.    Fecal colorectal cancer screen (FIT) is negative.      Sincerely,      Dr.Nasima Michael MD,FACP

## 2022-02-10 ENCOUNTER — OFFICE VISIT (OUTPATIENT)
Dept: FAMILY MEDICINE | Facility: CLINIC | Age: 77
End: 2022-02-10
Payer: COMMERCIAL

## 2022-02-10 VITALS
OXYGEN SATURATION: 100 % | TEMPERATURE: 97.7 F | SYSTOLIC BLOOD PRESSURE: 142 MMHG | HEART RATE: 59 BPM | DIASTOLIC BLOOD PRESSURE: 69 MMHG | BODY MASS INDEX: 25.16 KG/M2 | HEIGHT: 63 IN | WEIGHT: 142 LBS | RESPIRATION RATE: 16 BRPM

## 2022-02-10 DIAGNOSIS — F41.9 ANXIETY: ICD-10-CM

## 2022-02-10 DIAGNOSIS — F03.90 DEMENTIA WITHOUT BEHAVIORAL DISTURBANCE, UNSPECIFIED DEMENTIA TYPE: Primary | ICD-10-CM

## 2022-02-10 DIAGNOSIS — R45.3 APATHY: ICD-10-CM

## 2022-02-10 PROCEDURE — 99214 OFFICE O/P EST MOD 30 MIN: CPT | Performed by: INTERNAL MEDICINE

## 2022-02-10 ASSESSMENT — MIFFLIN-ST. JEOR: SCORE: 1117.2

## 2022-02-10 ASSESSMENT — PAIN SCALES - GENERAL: PAINLEVEL: NO PAIN (0)

## 2022-02-10 NOTE — PATIENT INSTRUCTIONS
Continue great care and support    Consider adding in some more activities that Afia may enjoy (exercise class, visits from friends) in the midday     Continue current medications    Follow up with Dr. Rodriguez as planned and me in a year or as needed

## 2022-02-10 NOTE — PROGRESS NOTES
MEMORY EVALUATION CLINIC - FOLLOWUP    INTERVAL HISTORY: Mrs. Afia Sylvester is a delightful 76yoF with history of anxiety and dementia here today for follow up accompanied by her  Dr. Sukhjinder Sylvester. Last seen by me just over a year ago and was stable and partnering with her  on IADLs. Over the past year, he has seen an overall decline in Afia. She is less able and interested in activities she used to enjoy and sleeps a lot more to which Sukhjinder wants to discuss and seems to be having some caregiver guilt over this. Overall they get along wonderfully and very much enjoy each other's company and she is still able to attend to her own self cares and is physically quite robust. Now fully dependent on IADLs other than helps with some light house work. Afia is no longer able to hold attention to read nor remembers how to turn on the TV. Doesn't like puzzles.    Daily routine:  Wakes up about 930 AM  Goes to the bathroom, sometimes showers,  has breakfast prepared for them  Naps from about 1030 AM - noon  Light lunch about noon  Naps again often from 1 PM - 4 PM or so  Watches the news and then has dinner afterwards  Evenings can have more activities especially if grandkids have sporting events in the area which she enjoys  No comment on overnights but likely sleeps well as wasn't a concern raised today      On Tuesdays, she enjoys spending the afternoon with her sister and her sister's granddaughter. Does seem to enjoy social gatherings, etc, once gets to them. Sukhjinder keeps busy throughout the day maintaining home/cooking/errands/hobbies and is starting to worry about Afia's amount of sleep and is brainstorming activities to do for her as it does seem to be d/t apathy/boredom rather than actually being tired. There is an exercise group at their complex that they are thinking about attending but would have to get up by 830 AM to attend this. In talking about this today, Afia is pretty quiet and  "doesn't have much to add but does feel her mood is good. Sukhjinder too agrees he'd like her to stay on current medications which have been helpful when initially started in the past (Donepezil and Lexapro).      OBJECTIVE:  BP (!) 142/69 (BP Location: Right arm, Patient Position: Sitting, Cuff Size: Adult Regular)   Pulse 59   Temp 97.7  F (36.5  C) (Temporal)   Resp 16   Ht 1.607 m (5' 3.25\")   Wt 64.4 kg (142 lb)   LMP  (LMP Unknown)   SpO2 100%   BMI 24.96 kg/m    Alert, pleasant, nicely groomed  Much less interactive today than at our visit over a year ago ; mostly lets  speak on her behalf though we try to include her  Sweet disposition  Normal gait  No tremor    INVESTIGATIONS: TSH and B12 within normal limits in 2021; no head imaging on file        PRIOR TESTING: Occupational therapy CPT was 4.8/5.6 in March 2018 with struggles involving some repetition/perseveration of tasks until she figured it out though getting frustrated some along the way. Recall MoCA 21/30 in Jan 2018 and she gets quite nervous with MoCA so hasn't wanted to repeat the testing at subsequent visits.        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 Lexapro and Donepezil have been helpful medications and she and her  partner well together in their lives. She is less active and engaged in activities over the past year and fully dependent on IADLs though still independent in self cares. Seems to be having some apathy/boredom which we discussed today and its common with advancing dementia. Discussed trying as able to find the balance between simpler activities she could enjoy and still ok to have some light naps as long as it doesn't interfere with nighttime sleep. They are contemplating a morning exercise class at their complex and more social " engagement. Not ready for a formal adult day program which is ok. Very supportive family too which is good. Sukhjinder hints at some thoughts of maybe the beginnings of starting to look at memory care facilities for down-the-line but they are very content now in their current senior coop.      Patient Instructions   Continue great care and support    Consider adding in some more activities that Afia may enjoy (exercise class, visits from friends) in the midday     Continue current medications    Follow up with Dr. Rodriguez as planned and me in a year or as needed        33 minutes spent on the date of the encounter doing chart review, history and exam, counseling, documentation and further activities as noted above      Teagan Tang, [facility]

## 2022-03-23 ENCOUNTER — INFUSION THERAPY VISIT (OUTPATIENT)
Dept: INFUSION THERAPY | Facility: CLINIC | Age: 77
End: 2022-03-23
Attending: INTERNAL MEDICINE
Payer: COMMERCIAL

## 2022-03-23 VITALS
DIASTOLIC BLOOD PRESSURE: 68 MMHG | BODY MASS INDEX: 24.43 KG/M2 | HEART RATE: 54 BPM | OXYGEN SATURATION: 97 % | WEIGHT: 139 LBS | TEMPERATURE: 97.5 F | SYSTOLIC BLOOD PRESSURE: 116 MMHG | RESPIRATION RATE: 16 BRPM

## 2022-03-23 DIAGNOSIS — M81.0 AGE-RELATED OSTEOPOROSIS WITHOUT CURRENT PATHOLOGICAL FRACTURE: Primary | ICD-10-CM

## 2022-03-23 DIAGNOSIS — Z92.29 HISTORY OF BISPHOSPHONATE THERAPY: ICD-10-CM

## 2022-03-23 PROCEDURE — 250N000011 HC RX IP 250 OP 636: Performed by: INTERNAL MEDICINE

## 2022-03-23 PROCEDURE — 96365 THER/PROPH/DIAG IV INF INIT: CPT

## 2022-03-23 RX ORDER — ALBUTEROL SULFATE 0.83 MG/ML
2.5 SOLUTION RESPIRATORY (INHALATION)
Status: CANCELLED | OUTPATIENT
Start: 2022-03-23

## 2022-03-23 RX ORDER — MEPERIDINE HYDROCHLORIDE 25 MG/ML
25 INJECTION INTRAMUSCULAR; INTRAVENOUS; SUBCUTANEOUS EVERY 30 MIN PRN
Status: CANCELLED | OUTPATIENT
Start: 2022-03-23

## 2022-03-23 RX ORDER — HEPARIN SODIUM (PORCINE) LOCK FLUSH IV SOLN 100 UNIT/ML 100 UNIT/ML
5 SOLUTION INTRAVENOUS
Status: CANCELLED | OUTPATIENT
Start: 2022-03-23

## 2022-03-23 RX ORDER — NALOXONE HYDROCHLORIDE 0.4 MG/ML
0.2 INJECTION, SOLUTION INTRAMUSCULAR; INTRAVENOUS; SUBCUTANEOUS
Status: CANCELLED | OUTPATIENT
Start: 2022-03-23

## 2022-03-23 RX ORDER — ZOLEDRONIC ACID 5 MG/100ML
5 INJECTION, SOLUTION INTRAVENOUS ONCE
Status: COMPLETED | OUTPATIENT
Start: 2022-03-23 | End: 2022-03-23

## 2022-03-23 RX ORDER — EPINEPHRINE 1 MG/ML
0.3 INJECTION, SOLUTION INTRAMUSCULAR; SUBCUTANEOUS EVERY 5 MIN PRN
Status: CANCELLED | OUTPATIENT
Start: 2022-03-23

## 2022-03-23 RX ORDER — ALBUTEROL SULFATE 90 UG/1
1-2 AEROSOL, METERED RESPIRATORY (INHALATION)
Status: CANCELLED
Start: 2022-03-23

## 2022-03-23 RX ORDER — DIPHENHYDRAMINE HYDROCHLORIDE 50 MG/ML
50 INJECTION INTRAMUSCULAR; INTRAVENOUS
Status: CANCELLED
Start: 2022-03-23

## 2022-03-23 RX ORDER — HEPARIN SODIUM,PORCINE 10 UNIT/ML
5 VIAL (ML) INTRAVENOUS
Status: CANCELLED | OUTPATIENT
Start: 2022-03-23

## 2022-03-23 RX ORDER — METHYLPREDNISOLONE SODIUM SUCCINATE 125 MG/2ML
125 INJECTION, POWDER, LYOPHILIZED, FOR SOLUTION INTRAMUSCULAR; INTRAVENOUS
Status: CANCELLED
Start: 2022-03-23

## 2022-03-23 RX ORDER — ZOLEDRONIC ACID 5 MG/100ML
5 INJECTION, SOLUTION INTRAVENOUS ONCE
Status: CANCELLED
Start: 2022-03-23 | End: 2022-03-23

## 2022-03-23 RX ADMIN — ZOLEDRONIC ACID 5 MG: 0.05 INJECTION, SOLUTION INTRAVENOUS at 11:05

## 2022-03-23 NOTE — PROGRESS NOTES
Infusion Nursing Note:  Afia Sylvester presents today for Reclast.    Patient seen by provider today: No   present during visit today: Not Applicable.    Note: N/A.      Intravenous Access:  Peripheral IV placed.    Treatment Conditions:  Lab Results   Component Value Date     11/23/2021    POTASSIUM 4.4 11/23/2021    CR 0.68 11/23/2021    PAOLA 9.3 11/23/2021    BILITOTAL 0.3 11/23/2021    ALBUMIN 4.0 11/23/2021    ALT 18 11/23/2021    AST 16 11/23/2021     Results reviewed, labs MET treatment parameters, ok to proceed with treatment.      Post Infusion Assessment:  Patient tolerated infusion without incident.  Blood return noted pre and post infusion.  Site patent and intact, free from redness, edema or discomfort.  No evidence of extravasations.  Access discontinued per protocol.       Discharge Plan:   Patient declined prescription refills.  Discharge instructions reviewed with: Patient and Family.  Patient and/or family verbalized understanding of discharge instructions and all questions answered.  AVS to patient via [employer].  Patient will return when scheduled for next appointment.   Patient discharged in stable condition accompanied by: self and .  Departure Mode: Ambulatory.      Chance Quezada RN

## 2022-04-06 ENCOUNTER — TELEPHONE (OUTPATIENT)
Dept: FAMILY MEDICINE | Facility: CLINIC | Age: 77
End: 2022-04-06
Payer: COMMERCIAL

## 2022-04-06 NOTE — TELEPHONE ENCOUNTER
Pt's spouse called regarding patient - (Edwin on C2C)     He is trying to find out direction regarding care for wife from Dr Tang    He is a physician and asking for a call back to discuss her care/follow up     Didn't give specific details, asks to speak with provider directly, but wants to know if provider has some insights about further dx on her dementia     Please advise   Ph: [phone]     Dahlia JAUREGUI Triage RN  [facility] Internal Medicine Clinic

## 2022-04-07 ENCOUNTER — VIRTUAL VISIT (OUTPATIENT)
Dept: FAMILY MEDICINE | Facility: CLINIC | Age: 77
End: 2022-04-07
Payer: COMMERCIAL

## 2022-04-07 DIAGNOSIS — Z53.9 ERRONEOUS ENCOUNTER--DISREGARD: Primary | ICD-10-CM

## 2022-04-07 NOTE — PROGRESS NOTES
Erroneous encounter - visit not needed     Sukhjinder says Afia is doing well - he just had a general question on memory care that he could pass on to some family friends

## 2022-07-28 NOTE — PATIENT INSTRUCTIONS
Continue present management  Follow up in 6 months for wellness visit   Seek sooner medical attention if there is any worsening of symptoms or problems.

## 2022-07-28 NOTE — PROGRESS NOTES
Assessment & Plan     Afia was seen today for follow up.    Diagnoses and all orders for this visit:    This patient has underlying dementia and she came with her     Dementia without behavioral disturbance, unspecified dementia type (H)  -     donepezil (ARICEPT) 5 MG tablet; Take 1 tablet (5 mg) by mouth daily  Currently stable  Patient has seen Dr. Teagan Tang also  Does not communicate much  But she is very nice and pleasant  Her memory is slowly getting worse  Her  is caregiver and takes good care of her  She has upcoming appointment to see Dr. Tang in September    Age-related osteoporosis without current pathological fracture  She takes adequate calcium and vitamin D  She is on IV Reclast and last one was on March of this year  Next one  will be due in in March of next year    Anxiety  -     escitalopram (LEXAPRO) 10 MG tablet; Take 1 tablet (10 mg) by mouth daily  She is taking Lexapro which is working for her    POLST (Physician Orders for Life-Sustaining Treatment)  As patient's memory is slowly getting worse her  updated advanced directives  They completed the POLST  Patient is DNR/DNI  Selective treatment  Her  thinks he would like to try tube feeding only for short period of time like 8 to 10 days in case if is an acute situation  But nothing to prolong her suffering  We made the copy of post and advanced directives  Sent it for scanning  Coordination was given back to patient           See Patient Instructions  Patient Instructions   Continue present management  Follow up in 6 months for wellness visit   Seek sooner medical attention if there is any worsening of symptoms or problems.         Return in about 6 months (around 1/28/2023) for wellness visit.    Natalie Rodriguez MD  [facility]   Afia is a 77 year old accompanied by her spouse, presenting for the following health issues:  Follow Up (6 month )      History of Present  "Illness       Reason for visit:  Complete POLST renew [facility] care directive    She eats 2-3 servings of fruits and vegetables daily.She consumes 1 sweetened beverage(s) daily.She exercises with enough effort to increase her heart rate 20 to 29 minutes per day.  She exercises with enough effort to increase her heart rate 4 days per week.   She is taking medications regularly.       She is here for routine follow-up  She lives with her    is a caregiver  Overall her memory is slowly getting worse  So far no behavioral issue    Review of Systems   Constitutional, HEENT, cardiovascular, pulmonary, gi and gu systems are negative, except as otherwise noted.      Objective    /70 (BP Location: Right arm, Patient Position: Chair, Cuff Size: Adult Large)   Pulse 55   Temp 97.3  F (36.3  C) (Temporal)   Resp 16   Ht 1.607 m (5' 3.25\")   Wt 59.9 kg (132 lb)   LMP  (LMP Unknown)   SpO2 95%   BMI 23.20 kg/m    Body mass index is 23.2 kg/m .  Physical Exam         GENERAL APPEARANCE: healthy, alert and no distress  EYES: Eyes grossly normal to inspection, PERRL and conjunctivae and sclerae normal  HENT: ear canals and TM's normal and nose and mouth without ulcers or lesions  NECK: no adenopathy  RESP: lungs clear to auscultation - no rales, rhonchi or wheezes  CV: regular rates and rhythm, normal S1 S2, no S3    She does not talk much  She is very nice and pleasant and calm  No behavioral issues despite dementia getting worse          Disclaimer: This note consists of symbols derived from keyboarding, dictation and/or voice recognition software. As a result, there may be errors in the script that have gone undetected. Please consider this when interpreting information found in this chart.          .  ..  " no tinnitus/no ear pain/no sinus symptoms/no nasal congestion/no nasal discharge

## 2022-09-20 NOTE — TELEPHONE ENCOUNTER
General Call    Contacts       Type Contact Phone/Fax    09/20/2022 09:50 AM CDT [facility] (Incoming) [phone]        Reason for Call:  Artemio is calling to verify that [facility] received fax for admission orders     What are your questions or concerns:  Requesting orders to be completed and faxed back to the facility    Fax    Atten Artemio Larsen/Placido-  [facility]

## 2022-09-22 NOTE — TELEPHONE ENCOUNTER
Form faxed with 7-28-22 OV notes that lists meds.  Immunization list included.   Copy in accordion Purple.  Orig to [facility] for scanning.   Echo MCADAMS MA

## 2022-09-26 PROBLEM — E55.9 VITAMIN D DEFICIENCY: Status: ACTIVE | Noted: 2020-03-27

## 2022-09-26 PROBLEM — S72.142D CLOSED DISPLACED INTERTROCHANTERIC FRACTURE OF LEFT FEMUR WITH ROUTINE HEALING: Status: ACTIVE | Noted: 2020-03-26

## 2022-09-26 PROBLEM — F03.90 DEMENTIA (H): Status: ACTIVE | Noted: 2020-03-26

## 2022-09-26 NOTE — PROGRESS NOTES
[facility] GERIATRICS    PRIMARY CARE PROVIDER AND CLINIC:  Nazanin Billy NP, [address] [facility] [address]  Chief Complaint   Patient presents with     Hospital F/U      [facility] Record Number:  [id number]  Place of Service where encounter took place:  [facility] () [[id number]]    Afia Sylvester  is a 77 year old  (1945), admitted to the above facility from home due to care needs 9/26/22.. .   HPI:    -Patient is a 77-year-old previously living with spouse at home,  Now requiring increased care needs.  -Daughter Dahlia present on exam, reports her mom is needing more prompting to eat and bathroom assist.  Is alert to self and intermittently date and time.  Able to ambulate independently, does monitor throughout the day.  -Interested in board and care memory care unit, but there were no rooms at time admission.    -Patient denies pain, shortness of breath, lightheadedness.  -Mood and demeanor pleasant without behaviors at this time.    BP Readings from Last 3 Encounters:   07/28/22 114/70   07/28/22 114/70   03/23/22 116/68     Pulse Readings from Last 4 Encounters:   07/28/22 55   07/28/22 55   03/23/22 54   02/10/22 59     Wt Readings from Last 4 Encounters:   07/28/22 (!) 27.2 kg (59 lb 14.4 oz)   07/28/22 59.9 kg (132 lb)   03/23/22 63 kg (139 lb)   02/10/22 64.4 kg (142 lb)       CODE STATUS/ADVANCE DIRECTIVES DISCUSSION:  No Order  DNR / DNI  ALLERGIES:   Allergies   Allergen Reactions     No Known Allergies       PAST MEDICAL HISTORY:   Past Medical History:   Diagnosis Date     Anxiety      Dementia (H)      History of endometrial cancer      Osteoporosis     has been on Fosamax in the past      PAST SURGICAL HISTORY:   has a past surgical history that includes Hysterectomy total abdominal, bilateral salpingo-oophorectomy, node dissection, combined (1998); knee surgery (2008); RAD RESEC TONSIL/PILLARS (1952); and hip surgery (Left, 03/27/2020).  FAMILY  "HISTORY: family history includes Heart Disease in her mother; Neurologic Disorder in her father.  SOCIAL HISTORY:   reports that she has never smoked. She has never used smokeless tobacco. She reports previous alcohol use. She reports that she does not use drugs.  Patient's living condition: lives with spouse    Post Discharge Medication Reconciliation Status:     Post Medication Reconciliation Status: Patient was not discharged from an inpatient facility or TCU         Current Outpatient Medications   Medication Sig     donepezil (ARICEPT) 5 MG tablet Take 1 tablet (5 mg) by mouth daily     escitalopram (LEXAPRO) 10 MG tablet Take 1 tablet (10 mg) by mouth daily     vitamin D3 (CHOLECALCIFEROL) 50 mcg (2000 units) tablet Take 1 tablet (50 mcg) by mouth three times a week     No current facility-administered medications for this visit.       ROS:  Unobtainable secondary to cognitive impairment.     Vitals:  /70   Pulse 55   Temp 97.3  F (36.3  C)   Resp 16   Ht 1.6 m (5' 3\")   Wt (!) 27.2 kg (59 lb 14.4 oz)   LMP  (LMP Unknown)   SpO2 95%   BMI 10.61 kg/m    Exam:  A & O x 3, NAD. Lungs CTA, non labored. RRR, S1/S2 w/o murmur,gallop or rub.  edema.  Abdomen soft, nontender, +BT'S. No focal neurological deficits.  Alert to self      Lab/Diagnostic data:  Labs done in SNF are in [facility]. Please refer to them using [employer]/Care Everywhere. and Recent labs in [facility] reviewed by me today.     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  With increased cognitive decline now requiring further assist.  [facility]  Plan:   -Continue long-term care support for medication administration, meals, safety.  -Expect further decline with progression of the disease including weight loss  -Continue Aricept 5 mg p.o. daily    (E78.00) Pure hypercholesterolemia  Comment: Per history  Plan: No change, no " further lab draws    (M81.8) Other osteoporosis without current pathological fracture  Comment: Chronic, stable.  Plan:   -Currently off medication, continue to monitor make adjustments as clinically indicated.    (F41.9) Anxiety  Comment: Longstanding history of anxiety, managed on current medication.  Plan:   -Continue Lexapro 10 mg p.o. daily    (K59.01) Slow transit constipation  Comment: Chronic, stable.  Plan:  -Continue MiraLAX p.o. daily    (E55.9) Vitamin D deficiency disease  Comment: Per history, patient taking supplements.  Without recent lab values  Plan:   Vitamin D level next lab day.  Continue supplement as ordered.    (Z85.42) Personal history of malignant neoplasm of other parts of uterus  Comment: Status post hysterectomy.  Plan: Surveillance monitoring.        Orders:  TSH, BMP, CBC, A1c      Total time spent with patient visit at the skilled nursing facility was 45 min including patient visit, review of past records and phone call to patient contact. Greater than 50% of total time spent with counseling and coordinating care due to new admission, medication review, and talk with family.     Electronically signed by:  Nazanin Billy NP

## 2022-09-26 NOTE — LETTER
9/26/2022        RE: Afai Sylvester  [address]  [address]  [facility] [address]        [facility] GERIATRICS    PRIMARY CARE PROVIDER AND CLINIC:  Nazanin Billy NP, [address] [facility] [address]  Chief Complaint   Patient presents with     Hospital F/U      [place] Medical Record Number:  [id number]  Place of Service where encounter took place:  [facility] () [[id number]]    Afia Sylvester  is a 77 year old  (1945), admitted to the above facility from home due to care needs 9/26/22.. .   HPI:    -Patient is a 77-year-old previously living with spouse at home,  Now requiring increased care needs.  -Daughter Dahlia present on exam, reports her mom is needing more prompting to eat and bathroom assist.  Is alert to self and intermittently date and time.  Able to ambulate independently, does monitor throughout the day.  -Interested in board and care memory care unit, but there were no rooms at time admission.    -Patient denies pain, shortness of breath, lightheadedness.  -Mood and demeanor pleasant without behaviors at this time.    BP Readings from Last 3 Encounters:   07/28/22 114/70   07/28/22 114/70   03/23/22 116/68     Pulse Readings from Last 4 Encounters:   07/28/22 55   07/28/22 55   03/23/22 54   02/10/22 59     Wt Readings from Last 4 Encounters:   07/28/22 (!) 27.2 kg (59 lb 14.4 oz)   07/28/22 59.9 kg (132 lb)   03/23/22 63 kg (139 lb)   02/10/22 64.4 kg (142 lb)       CODE STATUS/ADVANCE DIRECTIVES DISCUSSION:  No Order  DNR / DNI  ALLERGIES:   Allergies   Allergen Reactions     No Known Allergies       PAST MEDICAL HISTORY:   Past Medical History:   Diagnosis Date     Anxiety      Dementia (H)      History of endometrial cancer      Osteoporosis     has been on Fosamax in the past      PAST SURGICAL HISTORY:   has a past surgical history that includes Hysterectomy total abdominal, bilateral salpingo-oophorectomy, node dissection, combined (1998); knee  "surgery (2008); RAD RESEC TONSIL/PILLARS (1952); and hip surgery (Left, 03/27/2020).  FAMILY HISTORY: family history includes Heart Disease in her mother; Neurologic Disorder in her father.  SOCIAL HISTORY:   reports that she has never smoked. She has never used smokeless tobacco. She reports previous alcohol use. She reports that she does not use drugs.  Patient's living condition: lives with spouse    Post Discharge Medication Reconciliation Status:     Post Medication Reconciliation Status: Patient was not discharged from an inpatient facility or TCU         Current Outpatient Medications   Medication Sig     donepezil (ARICEPT) 5 MG tablet Take 1 tablet (5 mg) by mouth daily     escitalopram (LEXAPRO) 10 MG tablet Take 1 tablet (10 mg) by mouth daily     vitamin D3 (CHOLECALCIFEROL) 50 mcg (2000 units) tablet Take 1 tablet (50 mcg) by mouth three times a week     No current facility-administered medications for this visit.       ROS:  Unobtainable secondary to cognitive impairment.     Vitals:  /70   Pulse 55   Temp 97.3  F (36.3  C)   Resp 16   Ht 1.6 m (5' 3\")   Wt (!) 27.2 kg (59 lb 14.4 oz)   LMP  (LMP Unknown)   SpO2 95%   BMI 10.61 kg/m    Exam:  A & O x 3, NAD. Lungs CTA, non labored. RRR, S1/S2 w/o murmur,gallop or rub.  edema.  Abdomen soft, nontender, +BT'S. No focal neurological deficits.  Alert to self      Lab/Diagnostic data:  Labs done in SNF are in [facility]. Please refer to them using [employer]/Care Everywhere. and Recent labs in [facility] reviewed by me today.     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  With increased cognitive decline now requiring further assist.  Recently admitted to [place] long-term [facility]  Plan:   -Continue long-term care support for medication administration, meals, safety.  -Expect further decline with progression of the disease including weight loss  -Continue Aricept 5 " mg p.o. daily    (E78.00) Pure hypercholesterolemia  Comment: Per history  Plan: No change, no further lab draws    (M81.8) Other osteoporosis without current pathological fracture  Comment: Chronic, stable.  Plan:   -Currently off medication, continue to monitor make adjustments as clinically indicated.    (F41.9) Anxiety  Comment: Longstanding history of anxiety, managed on current medication.  Plan:   -Continue Lexapro 10 mg p.o. daily    (K59.01) Slow transit constipation  Comment: Chronic, stable.  Plan:  -Continue MiraLAX p.o. daily    (E55.9) Vitamin D deficiency disease  Comment: Per history, patient taking supplements.  Without recent lab values  Plan:   Vitamin D level next lab day.  Continue supplement as ordered.    (Z85.42) Personal history of malignant neoplasm of other parts of uterus  Comment: Status post hysterectomy.  Plan: Surveillance monitoring.        Orders:  TSH, BMP, CBC, A1c      Total time spent with patient visit at the [facility] nursing [facility] was 45 min including patient visit, review of past records and phone call to patient contact. Greater than 50% of total time spent with counseling and coordinating care due to new admission, medication review, and talk with family.     Electronically signed by:  Nazanin Billy NP                      Sincerely,        Nazanin Billy NP

## 2022-10-11 NOTE — PROGRESS NOTES
"[facility] GERIATRICS    Chief Complaint   Patient presents with     RECHECK     [facility] Care Coordination - Health Plan or Product Change     [facility] new enrollee     HPI:  Afia Sylvester is a 77 year old  (1945), who is being seen today for an episodic care visit at: [facility] () [[id number]]. Today's concern is: Patient laying in bed;  and daughter at bedside. Care conference set up for today.  reports his wife is demonstrating increased tearfulness and anxiety. Patient reports she is ok today and denies CP, SOB and lightheadedness. Fidgeting with with hearing aids which are new. Family reports they are ready to discontinue Aricept.    BP Readings from Last 3 Encounters:   10/11/22 116/76   07/28/22 114/70   07/28/22 114/70     Pulse Readings from Last 4 Encounters:   10/11/22 67   07/28/22 55   07/28/22 55   03/23/22 54     Wt Readings from Last 4 Encounters:   10/11/22 59 kg (130 lb)   07/28/22 (!) 27.2 kg (59 lb 14.4 oz)   07/28/22 59.9 kg (132 lb)   03/23/22 63 kg (139 lb)          The health plan new enrollment has happened. I have reviewed the  MDS, the preventative needs,  and facility care plan. The level of care is appropriate. I have reviewed the code status/advanced directives.     Allergies, and PMH/PSH reviewed in [employer] today.  REVIEW OF SYSTEMS:  Unobtainable secondary to cognitive impairment.     Objective:   /76   Pulse 67   Temp 97.6  F (36.4  C)   Resp 18   Ht 1.6 m (5' 3\")   Wt 59 kg (130 lb)   LMP  (LMP Unknown)   SpO2 96%   BMI 23.03 kg/m    A & O x 3, NAD. Lungs CTA, non labored. RRR, S1/S2 w/o murmur,gallop or rub.  edema.  Abdomen soft, nontender, +BT'S. No focal neurological deficits. Alert to self only.        Labs done in SNF are in [facility]. Please refer to them using [employer]/Care Everywhere. and Recent labs in [facility] reviewed by me today.     Assessment/Plan:    ICD-10-CM    1. Current mild episode of major depressive disorder without " prior episode (H)  F32.0       2. Senile dementia, uncomplicated (H)  F03.90       3. Anxiety  F41.9         Chronic, progressing dementia. Family ok with discontinuing Aricept at this time. Acclimating ok to new environment since admission.     Increased tearfulness and anxiety, noted when family leaves.     Increase lexapro 20 mg/day    Discontinue Aricept.         Post Medication Reconciliation Status: Patient was not discharged from an inpatient facility or TCU        Orders:  1. Discontinue Aricept  2. Increase Lexapro 20 mg/day     Electronically signed by:   Nazanin Billy NP

## 2022-10-11 NOTE — LETTER
"    10/11/2022        RE: Afia Sylvester  [address]  [address]  [facility] [address]        [facility] GERIATRICS    Chief Complaint   Patient presents with     RECHECK     [facility] Care Coordination - Health Plan or Product Change     P new enrollee     HPI:  Afia Sylvester is a 77 year old  (1945), who is being seen today for an episodic care visit at: [facility] () [[id number]]. Today's concern is: Patient laying in bed;  and daughter at bedside. Care conference set up for today.  reports his wife is demonstrating increased tearfulness and anxiety. Patient reports she is ok today and denies CP, SOB and lightheadedness. Fidgeting with with hearing aids which are new. Family reports they are ready to discontinue Aricept.    BP Readings from Last 3 Encounters:   10/11/22 116/76   07/28/22 114/70   07/28/22 114/70     Pulse Readings from Last 4 Encounters:   10/11/22 67   07/28/22 55   07/28/22 55   03/23/22 54     Wt Readings from Last 4 Encounters:   10/11/22 59 kg (130 lb)   07/28/22 (!) 27.2 kg (59 lb 14.4 oz)   07/28/22 59.9 kg (132 lb)   03/23/22 63 kg (139 lb)          The health plan new enrollment has happened. I have reviewed the  MDS, the preventative needs,  and facility care plan. The level of care is appropriate. I have reviewed the code status/advanced directives.     Allergies, and PMH/PSH reviewed in [facility] today.  REVIEW OF SYSTEMS:  Unobtainable secondary to cognitive impairment.     Objective:   /76   Pulse 67   Temp 97.6  F (36.4  C)   Resp 18   Ht 1.6 m (5' 3\")   Wt 59 kg (130 lb)   LMP  (LMP Unknown)   SpO2 96%   BMI 23.03 kg/m    A & O x 3, NAD. Lungs CTA, non labored. RRR, S1/S2 w/o murmur,gallop or rub.  edema.  Abdomen soft, nontender, +BT'S. No focal neurological deficits. Alert to self only.        Labs done in SNF are in [facility]. Please refer to them using [employer]/Care Everywhere. and Recent labs in [facility] reviewed by me today. "     Assessment/Plan:    ICD-10-CM    1. Current mild episode of major depressive disorder without prior episode (H)  F32.0       2. Senile dementia, uncomplicated (H)  F03.90       3. Anxiety  F41.9         Chronic, progressing dementia. Family ok with discontinuing Aricept at this time. Acclimating ok to new environment since admission.     Increased tearfulness and anxiety, noted when family leaves.     Increase lexapro 20 mg/day    Discontinue Aricept.         Post Medication Reconciliation Status: Patient was not discharged from an inpatient facility or TCU        Orders:  1. Discontinue Aricept  2. Increase Lexapro 20 mg/day     Electronically signed by:   Nazanin Billy NP            Sincerely,        Nazanin Billy NP

## 2022-10-19 NOTE — TELEPHONE ENCOUNTER
[facility] Triage Nurse Telephone Encounter    Provider: JANELLE Mccabe CNP  Facility: [facility] Facility Type:  LTC    Caller: Akil  Call Back Number: [phone]    Allergies:    Allergies   Allergen Reactions     No Known Allergies         Reason for call:     Nursing noticed that the pt was limping when she was walking today, when they took off her shoe they noticed the top of her Rt foot near the Great toe meets the foot was noted to have some swelling. ROM ok with no pain, no warmth, no bruising noted.     Verbal Order/Direction given by Provider: Xray of the Rt foot/great toe 2 views     Provider giving Order:  JANELLE Mccabe CNP    Verbal Order given to: Akil Rivera RN

## 2022-10-28 NOTE — LETTER
10/28/2022        RE: Afia Sylvester  [facility]  [address]  [facility] [address]        Afia Sylvester is a 77 year old female seen October 28, 2022 at [facility] where she has resided for one month (admit 9/2022) seen for initial visit.  Pt is seen on the unit, up ambulating without device.  Her speech is nonsensical and she is unable to attend to or answer questions.   Appears comfortable, and during visit she lies down to take a nap.     By chart review, pt had been living at home with family support but needing more assistance with cares, hence move to LTC facility.      Pt first saw Dr Teagan Tang for memory changes in 2017   Also had new onset anxiety at that time and started on escitalopram which was reported as helpful by family.      Seen by ENT for hearing loss as well.      In March 2020 pt fell and suffered a left hip fracture, returned home after TCU stay.    Has been treated for osteoporosis with vit D, dietary calcium and yearly Reclast.      Past Medical History:   Diagnosis Date     Anxiety      Dementia (H), dx 2017      History of endometrial cancer, 1998      Osteoporosis     has been on Fosamax in the past    Also treated with Reclast, last dose March 2022      Past Surgical History:   Procedure Laterality Date     HIP SURGERY Left 03/27/2020    left intertrochanteric hip fracture s/p left hip IMN 3/27/20 at [facility]     HYSTERECTOMY TOTAL ABDOMINAL, BILATERAL SALPINGO-OOPHORECTOMY, NODE DISSECTION, COMBINED  1998    endometrial cancer     KNEE SURGERY  2008    arthroscopic - right     ZZC RAD RESEC TONSIL/PILLARS  1952       Family History   Problem Relation Age of Onset     Heart Disease Mother      Neurologic Disorder Father         parkinsons     Diabetes No family hx of      Coronary Artery Disease No family hx of      Hypertension No family hx of      Hyperlipidemia No family hx of      Cerebrovascular Disease No family hx of      Breast Cancer No family hx  "of      Colon Cancer No family hx of      Prostate Cancer No family hx of      Other Cancer No family hx of      Depression No family hx of      Anxiety Disorder No family hx of      Mental Illness No family hx of      Substance Abuse No family hx of      Anesthesia Reaction No family hx of      Asthma No family hx of      Osteoporosis No family hx of      Genetic Disorder No family hx of      Thyroid Disease No family hx of      Obesity No family hx of      Unknown/Adopted No family hx of        Social History     Tobacco Use     Smoking status: Never     Smokeless tobacco: Never   Substance Use Topics     Alcohol use: Not Currently     Comment: none      SH:  Previously lived at home with her  Sukhjinder as caregiver    is Dr Sukhjinder Sylvester, retired geriatrician     Has 3 daughters: Teagan lives in [place], Kateryna lives in [facility], Dahlia is a retired RN.   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       Review Of Systems  Skin: negative   Eyes: impaired vision  Ears/Nose/Throat: hearing loss  Respiratory: No shortness of breath, dyspnea on exertion, cough, or hemoptysis  Cardiovascular: negative  Gastrointestinal: some weight loss    In July 2022 was 132 lbs>>> now 126 lbs     Genitourinary: negative  Musculoskeletal: ambulatory without device     Neurologic: dementia, MoCA 21/30 and CPT 4.8 in 2018    Psychiatric: anxiety  Hematologic/Lymphatic/Immunologic: negative  Endocrine: negative      GENERAL APPEARANCE: alert and no distress  /77   Pulse 74   Temp 98.3  F (36.8  C)   Resp 18   Ht 1.6 m (5' 3\")   Wt 57.5 kg (126 lb 12.8 oz)   LMP  (LMP Unknown)   SpO2 98%   BMI 22.46 kg/m     HEENT: normocephalic, no lesion or abnormalities  NECK: no adenopathy, no asymmetry, masses, or scars and thyroid normal to palpation  RESP: lungs clear to auscultation - no rales, rhonchi or wheezes  CV: regular rate and rhythm, normal S1 S2  ABDOMEN:  soft, nontender, no HSM or masses and bowel " sounds normal  MS: extremities normal- no gross deformities noted, no evidence of inflammation in joints, FROM in all extremities.  SKIN: no suspicious lesions or rashes  NEURO: Normal strength and tone, sensory exam grossly normal, and speech normal  PSYCH: affect okay  LYMPHATICS: No cervical,  or supraclavicular nodes    Last Comprehensive Metabolic Panel:  Sodium   Date Value Ref Range Status   11/23/2021 139 133 - 144 mmol/L Final     Potassium   Date Value Ref Range Status   11/23/2021 4.4 3.4 - 5.3 mmol/L Final     Carbon Dioxide (CO2)   Date Value Ref Range Status   11/23/2021 29 20 - 32 mmol/L Final     Glucose   Date Value Ref Range Status   11/23/2021 92 70 - 99 mg/dL Final     Urea Nitrogen   Date Value Ref Range Status   11/23/2021 16 7 - 30 mg/dL Final     Creatinine   Date Value Ref Range Status   11/23/2021 0.68 0.52 - 1.04 mg/dL Final     GFR Estimate   Date Value Ref Range Status   11/23/2021 86 >60 mL/min/1.73m2 Final     Calcium   Date Value Ref Range Status   11/23/2021 9.3 8.5 - 10.1 mg/dL Final     TSH   Date Value Ref Range Status   11/23/2021 2.02 0.40 - 4.00 mU/L Final   09/11/2017 2.73 0.40 - 4.00 mU/L Final      B12 level 834    Lab Results   Component Value Date    CHOL 227 11/23/2021      HDL 57 11/23/2021       11/23/2021      TRIG 93 11/23/2021     Lab Results   Component Value Date    WBC 6.2 11/23/2021      HGB 13.4 11/23/2021      MCV 96 11/23/2021       11/23/2021          IMP/PLAN:   (M81.0) Age-related osteoporosis without current pathological fracture    (E55.9) Vitamin D deficiency  Comment: left hip fracture in 2020     Remains ambulatory    Most recent vit D level was 31 a year ago     Plan: vit D 50 mcg/day and dietary calcium for bone health   Had an IV Reclast infusion in March 2022   Due again March 2023 (will need to out to clinic for that).        (F41.9) Anxiety  Comment: has accompanied cognitive decline     Medication helpful by family report    Plan:  escitalopram 20 mg/day      (G30.9,  F02.80) Alzheimer's dementia without behavioral disturbance (H)  Comment: progressive decline in language, function and memory     Recent weight loss     Plan: LTC support for med admin, meals, activity, secure unit.       Expect further decline as she has progressed considerably over past 5 years, per chart notes.        Advance directive: DNR/DNI, selective treatment per signed POLST Milagro Tan MD             Sincerely,        Milagro Tan MD

## 2022-10-31 NOTE — PROGRESS NOTES
"[facility] GERIATRICS DISCHARGE SUMMARY  PATIENT'S NAME: Afia Sylvester  YOB: 1945  MEDICAL RECORD NUMBER:  [id number]  Place of Service where encounter took place:  [facility] () [[id number]]    PRIMARY CARE PROVIDER AND CLINIC RESPONSIBLE AFTER TRANSFER:   Nazanin Billy NP, [address] [facility] [address]    Nursing Home: [facility]     Transferring providers: Roxy Clark CNA, Milagro Tan MD  Recent Hospitalization/ED:  Home .  Date of SNF Admission: September 26, 2022  Date of SNF (anticipated) Discharge: October 31, 2022  Discharged to: new assisted living for patient [place]      CODE STATUS/ADVANCE DIRECTIVES DISCUSSION:  DNR  ALLERGIES: No known allergies    NURSING FACILITY COURSE   Medication Changes/Rationale and Summary of nursing facility stay: :     PT was admitted to the LTC portion of [facility] [facility] and will discharge to the Board and Care portion of [place] where the activity level is more appropriate.   During LTC stay her Aricept was stopped and leapfrog was increased.          Medications reconciled.      Current Outpatient Medications   Medication Sig Dispense Refill     escitalopram (LEXAPRO) 10 MG tablet Take 2 tablets (20 mg) by mouth daily 90 tablet 3     polyethylene glycol (MIRALAX) 17 g packet Take 17 g by mouth daily       vitamin D3 (CHOLECALCIFEROL) 50 mcg (2000 units) tablet Take 1 tablet (50 mcg) by mouth three times a week            Controlled medications:   not applicable/none     Past Medical History:   Past Medical History:   Diagnosis Date     Anxiety      Dementia (H)      History of endometrial cancer      Osteoporosis     has been on Fosamax in the past     Physical Exam:   Vitals: /77   Pulse 74   Temp 98.3  F (36.8  C)   Resp 18   Ht 1.6 m (5' 3\")   Wt 57.5 kg (126 lb 12.8 oz)   LMP  (LMP Unknown)   BMI 22.46 kg/m    BMI: Body mass index is 22.46 kg/m .  Physical " Exam  Vitals and nursing note reviewed.   Constitutional:       General: She is not in acute distress.     Appearance: Normal appearance. She is not ill-appearing.   HENT:      Head: Normocephalic.   Cardiovascular:      Rate and Rhythm: Normal rate and regular rhythm.      Heart sounds: Normal heart sounds.   Pulmonary:      Effort: Pulmonary effort is normal.      Breath sounds: Normal breath sounds.   Abdominal:      General: Bowel sounds are normal.      Palpations: Abdomen is soft.   Musculoskeletal:      Cervical back: Normal range of motion.   Neurological:      Mental Status: She is alert.   Psychiatric:         Attention and Perception: Attention normal.         Mood and Affect: Affect is flat.         Speech: Speech normal.         Cognition and Memory: Cognition is impaired. Memory is impaired.      Comments: pacing           SNF labs:   Most Recent 3 CBC's:Recent Labs   Lab Test 11/23/21  1645 11/18/20  1218 09/11/17  1712   WBC 6.2  --   --    HGB 13.4 12.6 12.5   MCV 96  --   --      --   --      Most Recent 3 BMP's:Recent Labs   Lab Test 11/23/21  1645 11/18/20  1218 09/11/17  1712    140 142   POTASSIUM 4.4 4.3 4.2   CHLORIDE 106 108 108   CO2 29 26 28   BUN 16 15 23   CR 0.68 0.75 0.64   ANIONGAP 4 6 6   PAOLA 9.3 9.3 9.0   GLC 92 79 99     Most Recent 2 LFT's:Recent Labs   Lab Test 11/23/21  1645 09/11/17  1712   AST 16 21   ALT 18 31   ALKPHOS 99 75   BILITOTAL 0.3 0.3     Most Recent Cholesterol Panel:Recent Labs   Lab Test 11/23/21  1645   CHOL 227*   *   HDL 57   TRIG 93     DISCHARGE PLAN:    Follow up labs: consider Vitamin D, TSH with reflex to free T4, CMP, CBC, Hgb A1c    Discharge Services: No therapy or home care recommended.     Discharge Instructions Verbalized to Patient at Discharge:     None    TOTAL DISCHARGE TIME:   Greater than 30 minutes  Electronically signed by:  NEERAJ Fields APRN CNP on 10/31/2022 at 4:05 PM

## 2022-11-03 NOTE — PROGRESS NOTES
Afia Sylvester is a 77 year old female seen October 28, 2022 at [facility] where she has resided for one month (admit 9/2022) seen for initial visit.  Pt is seen on the unit, up ambulating without device.  Her speech is nonsensical and she is unable to attend to or answer questions.   Appears comfortable, and during visit she lies down to take a nap.     By chart review, pt had been living at home with family support but needing more assistance with cares, hence move to LTC facility.      Pt first saw Dr Teagan Tang for memory changes in 2017   Also had new onset anxiety at that time and started on escitalopram which was reported as helpful by family.      Seen by ENT for hearing loss as well.      In March 2020 pt fell and suffered a left hip fracture, returned home after TCU stay.    Has been treated for osteoporosis with vit D, dietary calcium and yearly Reclast.      Past Medical History:   Diagnosis Date     Anxiety      Dementia (H), dx 2017      History of endometrial cancer, 1998      Osteoporosis     has been on Fosamax in the past    Also treated with Reclast, last dose March 2022      Past Surgical History:   Procedure Laterality Date     HIP SURGERY Left 03/27/2020    left intertrochanteric hip fracture s/p left hip IMN 3/27/20 at [facility] [place]     HYSTERECTOMY TOTAL ABDOMINAL, BILATERAL SALPINGO-OOPHORECTOMY, NODE DISSECTION, COMBINED  1998    endometrial cancer     KNEE SURGERY  2008    arthroscopic - right     ZC RAD RESEC TONSIL/PILLARS  1952       Family History   Problem Relation Age of Onset     Heart Disease Mother      Neurologic Disorder Father         parkinsons     Diabetes No family hx of      Coronary Artery Disease No family hx of      Hypertension No family hx of      Hyperlipidemia No family hx of      Cerebrovascular Disease No family hx of      Breast Cancer No family hx of      Colon Cancer No family hx of      Prostate Cancer No family hx of      Other Cancer No family hx of       "Depression No family hx of      Anxiety Disorder No family hx of      Mental Illness No family hx of      Substance Abuse No family hx of      Anesthesia Reaction No family hx of      Asthma No family hx of      Osteoporosis No family hx of      Genetic Disorder No family hx of      Thyroid Disease No family hx of      Obesity No family hx of      Unknown/Adopted No family hx of        Social History     Tobacco Use     Smoking status: Never     Smokeless tobacco: Never   Substance Use Topics     Alcohol use: Not Currently     Comment: none      SH:  Previously lived at home with her  Sukhjinder as caregiver    is Dr Sukhjinder Sylvester, retired geriatrician     Has 3 daughters: Teagan lives in [place], Kateryna lives in [facility], Dahlia is a retired RN.   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       Review Of Systems  Skin: negative   Eyes: impaired vision  Ears/Nose/Throat: hearing loss  Respiratory: No shortness of breath, dyspnea on exertion, cough, or hemoptysis  Cardiovascular: negative  Gastrointestinal: some weight loss    In July 2022 was 132 lbs>>> now 126 lbs     Genitourinary: negative  Musculoskeletal: ambulatory without device     Neurologic: dementia, MoCA 21/30 and CPT 4.8 in 2018    Psychiatric: anxiety  Hematologic/Lymphatic/Immunologic: negative  Endocrine: negative      GENERAL APPEARANCE: alert and no distress  /77   Pulse 74   Temp 98.3  F (36.8  C)   Resp 18   Ht 1.6 m (5' 3\")   Wt 57.5 kg (126 lb 12.8 oz)   LMP  (LMP Unknown)   SpO2 98%   BMI 22.46 kg/m     HEENT: normocephalic, no lesion or abnormalities  NECK: no adenopathy, no asymmetry, masses, or scars and thyroid normal to palpation  RESP: lungs clear to auscultation - no rales, rhonchi or wheezes  CV: regular rate and rhythm, normal S1 S2  ABDOMEN:  soft, nontender, no HSM or masses and bowel sounds normal  MS: extremities normal- no gross deformities noted, no evidence of inflammation in joints, FROM in " all extremities.  SKIN: no suspicious lesions or rashes  NEURO: Normal strength and tone, sensory exam grossly normal, and speech normal  PSYCH: affect okay  LYMPHATICS: No cervical,  or supraclavicular nodes    Last Comprehensive Metabolic Panel:  Sodium   Date Value Ref Range Status   11/23/2021 139 133 - 144 mmol/L Final     Potassium   Date Value Ref Range Status   11/23/2021 4.4 3.4 - 5.3 mmol/L Final     Carbon Dioxide (CO2)   Date Value Ref Range Status   11/23/2021 29 20 - 32 mmol/L Final     Glucose   Date Value Ref Range Status   11/23/2021 92 70 - 99 mg/dL Final     Urea Nitrogen   Date Value Ref Range Status   11/23/2021 16 7 - 30 mg/dL Final     Creatinine   Date Value Ref Range Status   11/23/2021 0.68 0.52 - 1.04 mg/dL Final     GFR Estimate   Date Value Ref Range Status   11/23/2021 86 >60 mL/min/1.73m2 Final     Calcium   Date Value Ref Range Status   11/23/2021 9.3 8.5 - 10.1 mg/dL Final     TSH   Date Value Ref Range Status   11/23/2021 2.02 0.40 - 4.00 mU/L Final   09/11/2017 2.73 0.40 - 4.00 mU/L Final      B12 level 834    Lab Results   Component Value Date    CHOL 227 11/23/2021      HDL 57 11/23/2021       11/23/2021      TRIG 93 11/23/2021     Lab Results   Component Value Date    WBC 6.2 11/23/2021      HGB 13.4 11/23/2021      MCV 96 11/23/2021       11/23/2021          IMP/PLAN:   (M81.0) Age-related osteoporosis without current pathological fracture    (E55.9) Vitamin D deficiency  Comment: left hip fracture in 2020     Remains ambulatory    Most recent vit D level was 31 a year ago     Plan: vit D 50 mcg/day and dietary calcium for bone health   Had an IV Reclast infusion in March 2022   Due again March 2023 (will need to out to clinic for that).        (F41.9) Anxiety  Comment: has accompanied cognitive decline     Medication helpful by family report    Plan: escitalopram 20 mg/day      (G30.9,  F02.80) Alzheimer's dementia without behavioral disturbance (H)  Comment:  progressive decline in language, function and memory     Recent weight loss     Plan: LTC support for med admin, meals, activity, secure unit.       Expect further decline as she has progressed considerably over past 5 years, per chart notes.        Advance directive: DNR/DNI, selective treatment per signed AUDIE Tan MD

## 2022-11-10 NOTE — LETTER
November 14, 2022      Afia Sylvester  [facility]  [address] [facility] [address]              Sincerely,        Latoya Strange, JANELLE CNP

## 2022-11-10 NOTE — TELEPHONE ENCOUNTER
[facility] Geriatrics Triage Nurse Telephone Encounter    Provider: JANELLE Bejarano CNP  Facility: [facility] Facility Type:  LT    Allergies:    Allergies   Allergen Reactions     No Known Allergies         Verbal Order/Direction given by Provider:   1) Obtain ECG  2) If QT/QTc is less than 500 msec then start Celexa 5 mg PO daily and decrease Lexapro 10 mg x 3 days then stop; increase wondering    UPDATE: PT < 500 so order put in place    Provider giving Order:  JANELLE Bejarano CNP    Verbal Order given to: Ren/Ananda Carolina RN

## 2022-11-10 NOTE — TELEPHONE ENCOUNTER
Please call facility and obtain a ECG now    If QT/QTc is less than 500 msec then   Due to increase wondering, Start Celexa 5 mg daily and decrease Lexapro 10 mg x 3 days then stop.      JANELLE Dela Cruz CNP on 11/10/2022 at 4:07 PM

## 2022-11-10 NOTE — PROGRESS NOTES
[facility] GERIATRICS    PRIMARY CARE PROVIDER AND CLINIC:  JANELLE Dela Cruz CNP, [address] [facility] [address]  Chief Complaint   Patient presents with     Establish Care     TRANSFER FROM LT TO [facility]      [facility] Medical Record Number:  [id number]  Place of Service where encounter took place:  [facility] [[id number]]    Afia Sylvester  is a 77 year old  (1945), admitted to the above facility from [facility]..     Patient's medical history includes    Left hip fracture (3/2020)    Osteoporosis    Alzheimer's dementia    Vitamin D deficiency    Anxiety    History of endometrial cancer (1998)    Weight loss    On 9/2022, patient was admitted to [facility] long-term [facility]    On 11/7/2022, patient was transferred from long-term care to his care unit on boarding care    Today patient was seen walking around the secured dementia unit.  She is talking but is nonsensical.   She is difficult to redirect.  In fact a nurse went with her to her room had her lie down in bed and Samaria follow the nurse out of the room.   Code status is DNR/DNI.  In reviewing point click care, pt has lose 10# in the past year, Blood pressure and heart rate stable.       CODE STATUS/ADVANCE DIRECTIVES DISCUSSION:  No CPR- Do NOT Intubate    ALLERGIES:   Allergies   Allergen Reactions     No Known Allergies       PAST MEDICAL HISTORY:   Past Medical History:   Diagnosis Date     Anxiety      Dementia (H)      History of endometrial cancer      Osteoporosis     has been on Fosamax in the past      PAST SURGICAL HISTORY:   has a past surgical history that includes Hysterectomy total abdominal, bilateral salpingo-oophorectomy, node dissection, combined (1998); knee surgery (2008); RAD RESEC TONSIL/PILLARS (1952); and hip surgery (Left, 03/27/2020).  FAMILY HISTORY: family history includes Heart Disease in her mother; Neurologic Disorder in her father.  SOCIAL HISTORY:   reports that she has never smoked. She has  "never used smokeless tobacco. She reports that she does not currently use alcohol. She reports that she does not use drugs.  Patient's living condition: lives in an assisted living facility    Medications reconciled    Current Outpatient Medications   Medication Sig     citalopram (CELEXA) 10 MG/5ML solution Take 5 mg by mouth daily     polyethylene glycol (MIRALAX) 17 GM/Dose powder Take 17 g by mouth every other day     vitamin D3 (CHOLECALCIFEROL) 50 mcg (2000 units) tablet Take 1 tablet (50 mcg) by mouth three times a week     No current facility-administered medications for this visit.       Review of Systems   Reason unable to perform ROS: Dementia.       Vitals:  /75   Pulse 83   Temp 98  F (36.7  C)   Resp 16   Ht 1.6 m (5' 3\")   Wt 59 kg (130 lb)   LMP  (LMP Unknown)   SpO2 98%   BMI 23.03 kg/m    Exam:  Physical Exam  Vitals and nursing note reviewed.   Cardiovascular:      Rate and Rhythm: Normal rate and regular rhythm.      Heart sounds: Normal heart sounds.   Pulmonary:      Breath sounds: Normal breath sounds.   Neurological:      Mental Status: She is alert.   Psychiatric:         Mood and Affect: Mood is anxious.         Behavior: Behavior is agitated.         Cognition and Memory: Cognition is impaired. Memory is impaired.      Comments: Nonsensical speech while pacing         Lab/Diagnostic data:    Most Recent 3 CBC's:  Recent Labs   Lab Test 11/23/21 1645 11/18/20  1218 09/11/17  1712   WBC 6.2  --   --    HGB 13.4 12.6 12.5   MCV 96  --   --      --   --      Most Recent 3 BMP's:  Recent Labs   Lab Test 11/23/21  1645 11/18/20  1218 09/11/17  1712    140 142   POTASSIUM 4.4 4.3 4.2   CHLORIDE 106 108 108   CO2 29 26 28   BUN 16 15 23   CR 0.68 0.75 0.64   ANIONGAP 4 6 6   PAOLA 9.3 9.3 9.0   GLC 92 79 99     Most Recent 2 LFT's:  Recent Labs   Lab Test 11/23/21  1645 09/11/17  1712   AST 16 21   ALT 18 31   ALKPHOS 99 75   BILITOTAL 0.3 0.3     Most Recent Cholesterol " Panel:  Recent Labs   Lab Test 11/23/21  1645   CHOL 227*   *   HDL 57   TRIG 93     Most Recent TSH and T4:  Recent Labs   Lab Test 11/23/21  1645   TSH 2.02         ASSESSMENT/PLAN:    (F03.90) Dementia with agitation   (F32.0) Current mild episode of major depressive disorder without prior episode (H)  (F41.9) Anxiety  Comment:.,  Progressive, chronic, patient unable to take BIMS due to severe dementia.  She is requiring 24-hour skilled nursing care and would be a high risk of wandering if she were moved off the dementia care unit.  Patient continues to pace frequently during the day.  Is difficult to redirect.    Expect further functional and cognitive decline.     Plan: We will plan on ECG, if QTC is less than 500 ms.  We will start Celexa 5 mg daily and decrease to Lexapro 10 mg x3 days then stop.   Plan:  Will reassess behaviors    (E55.9) Vitamin D deficiency disease  (M81.0) Age-related osteoporosis without current pathological fracture  Comment: chronic, controlled while taking vit D3. Had an IV Reclast infusion in March 2022   Due again March 2023 (will need to out to clinic for that).      Plan: Continue with plan of care no changes at this time, adjustment as needed      (Z85.42) Personal history of malignant neoplasm of other parts of uterus  Comment: resolved.    Plan: Continue with plan of care no changes at this time, adjustment as needed      Orders  If QT/QTc is less than 500 msec then   Due to increase wondering, Start Celexa 5 mg daily and decrease Lexapro 10 mg x 3 days then stop.      Electronically signed by:  JANELLE Dela Cruz CNP

## 2022-11-14 NOTE — PROGRESS NOTES
Code Status:  DNR/DNI  Visit Type: Nursing Home Regulatory     Facility:   [facility] ([facility]) [[id number]]         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      Patient's medical history includes    Left hip fracture (3/2020)    Osteoporosis    Alzheimer's dementia    Vitamin D deficiency    Anxiety    History of endometrial cancer (1998)    Weight loss     On 9/2022, patient was admitted to [facility]     On 11/7/2022, patient was transferred from long-term care to his care unit on [facility]    On 11/10/2022, patient was observed to be pacing the secured dementia unit and going into other peoples rooms.  She was talking in nonsensical manner.   She had an ECG done which revealed a QTC less than 500 ms and she was started on Celexa 5 mg daily and her Lexapro was decreased to 10 mg daily x3 days then subsequently discontinued.    Today patient was seen in her room lying down however when writer introduced herself, pt left.   She was mumbling non-sensical words. Due cognitive impairment pt was not able to answer ROS questions.  Per nursing, pt continues to walk/pace/bumping into other resident/in&out of others rooms.     She is dependent with cares and her diet is finger foods. Code status is DNR/DNI.      In reviewing point click care, VS stable, weight stable.       Current Outpatient Medications   Medication Sig Dispense Refill     citalopram (CELEXA) 10 MG/5ML solution Take 5 mg by mouth daily       polyethylene glycol (MIRALAX) 17 GM/Dose powder Take 17 g by mouth every other day 510 g      vitamin D3 (CHOLECALCIFEROL) 50 mcg (2000 units) tablet Take 1 tablet (50 mcg) by mouth three times a week         Review of Systems   Unable to answer questions of ROS    Physical Exam  Vitals and nursing note reviewed.   Constitutional:       Appearance: Normal appearance.    HENT:      Head: Normocephalic.   Cardiovascular:      Rate and Rhythm: Normal rate and regular rhythm.      Heart sounds: Normal heart sounds.   Pulmonary:      Effort: Pulmonary effort is normal.      Breath sounds: Normal breath sounds.   Musculoskeletal:         General: Normal range of motion.   Skin:     General: Skin is warm.   Neurological:      Mental Status: She is alert. She is disoriented.   Psychiatric:         Attention and Perception: She is attentive.         Mood and Affect: Affect is flat.         Behavior: Behavior is hyperactive.         Cognition and Memory: Cognition is impaired. Memory is impaired.         Judgment: Judgment is impulsive and inappropriate.         Labs:    Most Recent 3 CBC's:  Recent Labs   Lab Test 11/23/21  1645 11/18/20  1218 09/11/17  1712   WBC 6.2  --   --    HGB 13.4 12.6 12.5   MCV 96  --   --      --   --      Most Recent 3 BMP's:  Recent Labs   Lab Test 11/23/21  1645 11/18/20  1218 09/11/17  1712    140 142   POTASSIUM 4.4 4.3 4.2   CHLORIDE 106 108 108   CO2 29 26 28   BUN 16 15 23   CR 0.68 0.75 0.64   ANIONGAP 4 6 6   PAOLA 9.3 9.3 9.0   GLC 92 79 99     Most Recent 2 LFT's:  Recent Labs   Lab Test 11/23/21  1645 09/11/17  1712   AST 16 21   ALT 18 31   ALKPHOS 99 75   BILITOTAL 0.3 0.3     Most Recent Cholesterol Panel:  Recent Labs   Lab Test 11/23/21  1645   CHOL 227*   *   HDL 57   TRIG 93     Most Recent TSH and T4:  Recent Labs   Lab Test 11/23/21  1645   TSH 2.02         Assessment/Plan:  (H91.90) Hearing loss, unspecified hearing loss type, unspecified laterality  (primary encounter diagnosis)  Comment: chronic stable.   Speak clearly and slowly  Plan: Continue with plan of care no changes at this time, adjustment as needed    (F03.90) Dementia without behavioral disturbance, unspecified dementia type  Comment: chronic progressive Review of systems in HPI unable to be obtained due to cognitive impairment. She is requiring 24-hour  skilled nursing care and would be a high risk of wandering if were moved off the dementia care unit.   She is wondering into other patients rooms, appears anxious.  Changed her Lexapro to Celexa. Expect further functional and cognitive decline.  Expect weight loss.     Plan: spoke with her  today for 15 minutes regarding medications and he is okay with trying a new selective serotonin reuptake inhibitor.  May need to consider trazodone      (M81.0) Age-related osteoporosis without current pathological fracture  (E55.9) Vitamin D deficiency  Comment: chronic, while on vit D  Plan: Continue with plan of care no changes at this time, adjustment as needed      Orders:   At next visit, consider ordering BMP, CBC, Vit D, Vit B12, TSH      Electronically signed by:    JANELLE Dela Cruz CNP on 11/15/2022 at 7:27 PM

## 2022-11-15 NOTE — LETTER
11/15/2022        RE: Afia Randall [facility]  [address]  [facility] [address]        Code Status:  DNR/DNI  Visit Type: Nursing Home Regulatory     Facility:   [facility] ([facility]) [[id number]]         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      Patient's medical history includes    Left hip fracture (3/2020)    Osteoporosis    Alzheimer's dementia    Vitamin D deficiency    Anxiety    History of endometrial cancer (1998)    Weight loss     On 9/2022, patient was admitted to [facility]     On 11/7/2022, patient was transferred from long-term care to his care unit on [facility]    On 11/10/2022, patient was observed to be pacing the secured dementia unit and going into other peoples rooms.  She was talking in nonsensical manner.   She had an ECG done which revealed a QTC less than 500 ms and she was started on Celexa 5 mg daily and her Lexapro was decreased to 10 mg daily x3 days then subsequently discontinued.    Today patient was seen in her room lying down however when writer introduced herself, pt left.   She was mumbling non-sensical words. Due cognitive impairment pt was not able to answer ROS questions.  Per nursing, pt continues to walk/pace/bumping into other resident/in&out of others rooms.     She is dependent with cares and her diet is finger foods. Code status is DNR/DNI.      In reviewing point click care, VS stable, weight stable.       Current Outpatient Medications   Medication Sig Dispense Refill     citalopram (CELEXA) 10 MG/5ML solution Take 5 mg by mouth daily       polyethylene glycol (MIRALAX) 17 GM/Dose powder Take 17 g by mouth every other day 510 g      vitamin D3 (CHOLECALCIFEROL) 50 mcg (2000 units) tablet Take 1 tablet (50 mcg) by mouth three times a week         Review of Systems   Unable to answer  questions of ROS    Physical Exam  Vitals and nursing note reviewed.   Constitutional:       Appearance: Normal appearance.   HENT:      Head: Normocephalic.   Cardiovascular:      Rate and Rhythm: Normal rate and regular rhythm.      Heart sounds: Normal heart sounds.   Pulmonary:      Effort: Pulmonary effort is normal.      Breath sounds: Normal breath sounds.   Musculoskeletal:         General: Normal range of motion.   Skin:     General: Skin is warm.   Neurological:      Mental Status: She is alert. She is disoriented.   Psychiatric:         Attention and Perception: She is attentive.         Mood and Affect: Affect is flat.         Behavior: Behavior is hyperactive.         Cognition and Memory: Cognition is impaired. Memory is impaired.         Judgment: Judgment is impulsive and inappropriate.         Labs:    Most Recent 3 CBC's:  Recent Labs   Lab Test 11/23/21  1645 11/18/20  1218 09/11/17  1712   WBC 6.2  --   --    HGB 13.4 12.6 12.5   MCV 96  --   --      --   --      Most Recent 3 BMP's:  Recent Labs   Lab Test 11/23/21  1645 11/18/20  1218 09/11/17  1712    140 142   POTASSIUM 4.4 4.3 4.2   CHLORIDE 106 108 108   CO2 29 26 28   BUN 16 15 23   CR 0.68 0.75 0.64   ANIONGAP 4 6 6   PAOLA 9.3 9.3 9.0   GLC 92 79 99     Most Recent 2 LFT's:  Recent Labs   Lab Test 11/23/21  1645 09/11/17  1712   AST 16 21   ALT 18 31   ALKPHOS 99 75   BILITOTAL 0.3 0.3     Most Recent Cholesterol Panel:  Recent Labs   Lab Test 11/23/21  1645   CHOL 227*   *   HDL 57   TRIG 93     Most Recent TSH and T4:  Recent Labs   Lab Test 11/23/21  1645   TSH 2.02         Assessment/Plan:  (H91.90) Hearing loss, unspecified hearing loss type, unspecified laterality  (primary encounter diagnosis)  Comment: chronic stable.   Speak clearly and slowly  Plan: Continue with plan of care no changes at this time, adjustment as needed    (F03.90) Dementia without behavioral disturbance, unspecified dementia type  Comment:  chronic progressive Review of systems in HPI unable to be obtained due to cognitive impairment. She is requiring 24-hour skilled nursing care and would be a high risk of wandering if were moved off the dementia care unit.   She is wondering into other patients rooms, appears anxious.  Changed her Lexapro to Celexa. Expect further functional and cognitive decline.  Expect weight loss.     Plan: spoke with her  today for 15 minutes regarding medications and he is okay with trying a new selective serotonin reuptake inhibitor.  May need to consider trazodone      (M81.0) Age-related osteoporosis without current pathological fracture  (E55.9) Vitamin D deficiency  Comment: chronic, while on vit D  Plan: Continue with plan of care no changes at this time, adjustment as needed      Orders:   At next visit, consider ordering BMP, CBC, Vit D, Vit B12, TSH      Electronically signed by:    JANELLE Dela Cruz CNP on 11/15/2022 at 7:27 PM                Sincerely,        JANELLE Dela Cruz CNP

## 2022-11-15 NOTE — LETTER
11/15/2022        RE: Afia Randall [facility]  [address]  [place] [facility] [address]        Code Status:  DNR/DNI  Visit Type: No chief complaint on file.     Facility:   No question data found.         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      Patient's medical history includes    Left hip fracture (3/2020)    Osteoporosis    Alzheimer's dementia    Vitamin D deficiency    Anxiety    History of endometrial cancer (1998)    Weight loss     On 9/2022, patient was admitted to [facility]     On 11/7/2022, patient was transferred from long-term care to his care unit on [facility]    On 11/10/2022, patient was observed to be pacing the secured dementia unit and going into other peoples rooms.  She was talking in nonsensical manner.   She had an ECG done which revealed a QTC less than 500 ms and she was started on Celexa 5 mg daily and her Lexapro was decreased to 10 mg daily x3 days then subsequently discontinued.    Today patient was seen in her room lying down however when writer introduced herself, pt left.   She was mumbling non-sensical words. Due due cognitive impairment pt was not able to answer ROS questions.  Per nursing, pt continues to walk/pace/bumping into other resident/in&out of others rooms.     She is dependent with cares and her diet is finger foods. Code status is DNR/DNI.      In reviewing point click care, VS stable, weight stable.       Current Outpatient Medications   Medication Sig Dispense Refill     citalopram (CELEXA) 10 MG/5ML solution Take 5 mg by mouth daily       polyethylene glycol (MIRALAX) 17 GM/Dose powder Take 17 g by mouth every other day 510 g      vitamin D3 (CHOLECALCIFEROL) 50 mcg (2000 units) tablet Take 1 tablet (50 mcg) by mouth three times a week         Review of Systems   Unable to answer  questions of ROS    Physical Exam  Vitals and nursing note reviewed.   Constitutional:       Appearance: Normal appearance.   HENT:      Head: Normocephalic.   Cardiovascular:      Rate and Rhythm: Normal rate and regular rhythm.      Heart sounds: Normal heart sounds.   Pulmonary:      Effort: Pulmonary effort is normal.      Breath sounds: Normal breath sounds.   Musculoskeletal:         General: Normal range of motion.   Skin:     General: Skin is warm.   Neurological:      Mental Status: She is alert. She is disoriented.   Psychiatric:         Attention and Perception: She is attentive.         Mood and Affect: Affect is flat.         Behavior: Behavior is hyperactive.         Cognition and Memory: Cognition is impaired. Memory is impaired.         Judgment: Judgment is impulsive and inappropriate.       ***    Labs:    Most Recent 3 CBC's:Recent Labs   Lab Test 21  1645 20  1218 17  1712   WBC 6.2  --   --    HGB 13.4 12.6 12.5   MCV 96  --   --      --   --      Most Recent 3 BMP's:Recent Labs   Lab Test 21  1645 20  1218 17  1712    140 142   POTASSIUM 4.4 4.3 4.2   CHLORIDE 106 108 108   CO2 29 26 28   BUN 16 15 23   CR 0.68 0.75 0.64   ANIONGAP 4 6 6   PAOLA 9.3 9.3 9.0   GLC 92 79 99     Most Recent 2 LFT's:Recent Labs   Lab Test 21  1645 17  1712   AST 16 21   ALT 18 31   ALKPHOS 99 75   BILITOTAL 0.3 0.3     Most Recent Cholesterol Panel:Recent Labs   Lab Test 21  1645   CHOL 227*   *   HDL 57   TRIG 93     Most Recent TSH and T4:Recent Labs   Lab Test 21  1645   TSH 2.02         Assessment/Plan:  {fgsdia}          Electronically signed by:JANELLE Dela Cruz CNP                Sincerely,        JANELLE Dela Cruz CNP

## 2022-11-17 NOTE — TELEPHONE ENCOUNTER
pt continues to wander.   Going into other pt's room, taking other pt's food off their plates or drinking their fluids at meals.        celexa 10 mg daily increase from 5 mg  Start hydroxyzine 12.5 mg BID and prn BID for anxiety

## 2022-11-25 NOTE — TELEPHONE ENCOUNTER
Spoke with  who has so many concerns.  Afia Sylvester is more paranoid, agitated, manic, she has aphasia so not able to elaborate to staff/ concerns.    She is less able to be directed.   He is okay with starting antipsychotic medications to help with behaviors.     Latoya Strange, JANELLE CNP on 11/25/2022 at 10:30 AM

## 2022-11-29 NOTE — PROGRESS NOTES
[facility]    Chief Complaint   Patient presents with     RECHECK     HPI:  Afia Sylvester is a 77 year old  (1945), who is being seen today for an episodic care visit at: [facility] ([facility]) [[id number]].       Patient's medical history includes    Left hip fracture (3/2020)    Osteoporosis    Alzheimer's dementia    Vitamin D deficiency    Anxiety    History of endometrial cancer (1998)    Weight loss     On 9/2022, patient was admitted to [facility] [place]     On 11/7/2022, patient was transferred from long-term care to his care unit on [facility]     On 11/10/2022, patient was observed to be pacing the secured dementia unit and going into other peoples rooms.  She was talking in nonsensical manner.   She had an ECG done which revealed a QTC less than 500 ms and she was started on Celexa 5 mg daily and her Lexapro was decreased to 10 mg daily x3 days then subsequently discontinued.    On 11/17, pt was started on Celexa and hydroxyzine BID and prn.     Today patient was seen walking in the hallways and dining room.  During lunch she was redirected so she did not take food off other residents plates.   After lunch she came up to writer with a handful of mashed sweet potatoes and broccoli wanting to give them to writer.   She was also witnessed holding tightly on to another patient and needed to be redirected again.  Staff worry pt is going to get hurt by another resident as she does not have boundaries.   She is not able to be understood therefore ROS was not completed.  Her  called on 11/25 and writer spoke to him.  He is worried about her paranoia.   called writer today and his call was returned message left once and no answer the second time.  Pt weight is stable at 126#.  VS stable.      Allergies, and PMH/PSH reviewed in EPIC today.  REVIEW OF SYSTEMS:  Unobtainable secondary to cognitive impairment.     Objective:   /60   Pulse 65   Temp 97.7  F (36.5  C)    Resp 17   Ht 1.524 m (5')   Wt 57.3 kg (126 lb 6.4 oz)   LMP  (LMP Unknown)   SpO2 96%   BMI 24.69 kg/m    Physical Exam  Vitals and nursing note reviewed.   Pulmonary:      Effort: Pulmonary effort is normal.      Breath sounds: Normal breath sounds.   Musculoskeletal:      Cervical back: Normal range of motion.   Neurological:      Mental Status: She is alert.   Psychiatric:         Attention and Perception: She is inattentive.         Mood and Affect: Mood is anxious.         Behavior: Behavior is agitated.         Cognition and Memory: Cognition is impaired. Memory is impaired.         Judgment: Judgment is inappropriate.      Comments: Non sense speech           Assessment/Plan:  (G30.9) Dementia with behavioral disturbance  Comment: chronic progressive with more ongoing behaviors.   Review of systems in HPI unable to be obtained due to cognitive impairment. She is requiring 24-hour skilled nursing care and would be a high risk of wandering if were moved off the dementia care unit.   She is wondering into other patients rooms, takes food off others plates paces the unit. Her speech is non-sense.   Her  is concerned she is paranoid.   Celexa and hydroxyzine currently not effective.    Plan:   Stop hydroxyzine   Will start risperidone 0.25 mg BID due to dementia with behavior  Tried to call the  x 2.  Left one message in the am and no answer in the pm.     MED REC COMPLETED    Electronically signed by:    JANELLE Dela Cruz CNP on 11/29/2022 at 8:23 PM

## 2022-11-29 NOTE — LETTER
11/29/2022        RE: Afia Sylvester  [facility]  [address] [facility] [address]        No notes on file      Sincerely,        JANELLE Dela Cruz CNP

## 2022-11-29 NOTE — LETTER
11/29/2022        RE: Afia Sylvester  [facility]  [address]  [facility] [address]        [facility] GERIATR[facility]    Chief Complaint   Patient presents with     RECHECK     HPI:  Afia Sylvester is a 77 year old  (1945), who is being seen today for an episodic care visit at: [facility] ([facility]) [[id number]].       Patient's medical history includes    Left hip fracture (3/2020)    Osteoporosis    Alzheimer's dementia    Vitamin D deficiency    Anxiety    History of endometrial cancer (1998)    Weight loss     On 9/2022, patient was admitted to [facility]     On 11/7/2022, patient was transferred from long-term care to his care unit on [facility]     On 11/10/2022, patient was observed to be pacing the secured dementia unit and going into other peoples rooms.  She was talking in nonsensical manner.   She had an ECG done which revealed a QTC less than 500 ms and she was started on Celexa 5 mg daily and her Lexapro was decreased to 10 mg daily x3 days then subsequently discontinued.    On 11/17, pt was started on Celexa and hydroxyzine BID and prn.     Today patient was seen walking in the hallways and dining room.  During lunch she was redirected so she did not take food off other residents plates.   After lunch she came up to writer with a handful of mashed sweet potatoes and broccoli wanting to give them to writer.   She was also witnessed holding tightly on to another patient and needed to be redirected again.  Staff worry pt is going to get hurt by another resident as she does not have boundaries.   She is not able to be understood therefore ROS was not completed.  Her  called on 11/25 and writer spoke to him.  He is worried about her paranoia.   called writer today and his call was returned message left once and no answer the second time.  Pt weight is stable at 126#.  VS stable.      Allergies, and PMH/PSH reviewed in EPIC today.  REVIEW OF  SYSTEMS:  Unobtainable secondary to cognitive impairment.     Objective:   /60   Pulse 65   Temp 97.7  F (36.5  C)   Resp 17   Ht 1.524 m (5')   Wt 57.3 kg (126 lb 6.4 oz)   LMP  (LMP Unknown)   SpO2 96%   BMI 24.69 kg/m    Physical Exam  Vitals and nursing note reviewed.   Pulmonary:      Effort: Pulmonary effort is normal.      Breath sounds: Normal breath sounds.   Musculoskeletal:      Cervical back: Normal range of motion.   Neurological:      Mental Status: She is alert.   Psychiatric:         Attention and Perception: She is inattentive.         Mood and Affect: Mood is anxious.         Behavior: Behavior is agitated.         Cognition and Memory: Cognition is impaired. Memory is impaired.         Judgment: Judgment is inappropriate.      Comments: Non sense speech           Assessment/Plan:  (G30.9) Dementia with behavioral disturbance  Comment: chronic progressive with more ongoing behaviors.   Review of systems in HPI unable to be obtained due to cognitive impairment. She is requiring 24-hour skilled nursing care and would be a high risk of wandering if were moved off the dementia care unit.   She is wondering into other patients rooms, takes food off others plates paces the unit. Her speech is non-sense.   Her  is concerned she is paranoid.   Celexa and hydroxyzine currently not effective.    Plan:   Stop hydroxyzine   Will start risperidone 0.25 mg BID due to dementia with behavior  Tried to call the  x 2.  Left one message in the am and no answer in the pm.     MED REC COMPLETED    Electronically signed by:    JANELLE Dela Cruz CNP on 11/29/2022 at 8:23 PM            Sincerely,        JANELLE Dela Cruz CNP

## 2022-12-05 NOTE — PROGRESS NOTES
Afia Sylvester is a 77 year old female seen December 5, 2022 at [facility] where she has resided for 3 months (admit to LTC 9/2022) seen to follow up dementia with restlessness and anxiety.   Pt is seen on the unit up to chair.   Initially quiet and somewhat interactive, but then later gets up and wanders about the unit, does not interact and is difficult to redirect.      Nursing staff has reported pt has been wandering and intrusive, will take food off other resident's plates at meals.  Family has reported more paranoia and agitation.  Had an incident where she was holding on tightly to another resident.   Risperidone started for these behaviors, and family feels she is improved.   Still wandering though.      By chart review, pt had been living at home with family support but needing more assistance with cares; initially moved to LT, now transferred [facility].  Family still not sure this is the right setting for pt, and are looking at other options.        Pt first saw Dr Teagan Tang for memory changes in 2017   Also had new onset anxiety at that time and started on escitalopram which was reported as helpful by family.      Seen by ENT for hearing loss as well.      In March 2020 pt fell and suffered a left hip fracture, returned home after TCU stay.    Has been treated for osteoporosis with vit D, dietary calcium and yearly Reclast.      Past Medical History:   Diagnosis Date     Anxiety      Dementia (H), dx 2017      History of endometrial cancer, 1998      Osteoporosis     has been on Fosamax in the past    Also treated with Reclast, last dose March 2022      Past Surgical History:   Procedure Laterality Date     HIP SURGERY Left 03/27/2020    left intertrochanteric hip fracture s/p left hip IMN 3/27/20 at [facility]     HYSTERECTOMY TOTAL ABDOMINAL, BILATERAL SALPINGO-OOPHORECTOMY, NODE DISSECTION, COMBINED  1998    endometrial cancer     KNEE SURGERY  2008     arthroscopic - right     ZZC RAD RESEC TONSIL/PILLARS  1952     SH:  Previously lived at home with her  Sukhjinder as caregiver    is Dr Sukhjinder Sylvester, retired geriatrician     Has 3 daughters: Teagan lives in [place], Kateryna lives in [facility], Dahlia is a retired RN.   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     Non smoker     Review Of Systems  She is ambulatory without device      MoCA 21/30 and CPT 4.8 in 2018    In July 2022 was 132 lb  Wt Readings from Last 5 Encounters:   12/05/22 59.1 kg (130 lb 3.2 oz)   11/29/22 57.3 kg (126 lb 6.4 oz)   11/15/22 58.1 kg (128 lb)   11/10/22 59 kg (130 lb)   10/31/22 57.5 kg (126 lb 12.8 oz)     EXAM: NAD  /76   Pulse 79   Temp 97.9  F (36.6  C)   Resp 18   Ht 1.524 m (5')   Wt 59.1 kg (130 lb 3.2 oz)   LMP  (LMP Unknown)   SpO2 98%   BMI 25.43 kg/m     Neck supple without adenopathy  Lungs clear bilaterally with fair air movement  Heart RRR s1s2   Abd soft, NT, no distention or guarding, +BS  Ext without edema  Neuro: very limited verbal skills, ambulatory with steady gait   No tremor or stiffness  Psych: a little guarded.     Labs reviewed      IMP/PLAN:   (M81.0) Age-related osteoporosis without current pathological fracture    (E55.9) Vitamin D deficiency  Comment: left hip fracture in 2020     Remains ambulatory    Most recent vit D level was 31    Plan: vit D 50 mcg/day and dietary calcium for bone health   Had an IV Reclast infusion in March 2022   Due again March 2023 (will need to go out to clinic for that).        (F41.9) Anxiety  Comment: has accompanied cognitive decline     Medication helpful by family report    Plan: citalopram 10 mg/day      (R45.1) Agitation    (R46.89) Wandering  (G30.9,  F02.C18) Severe Alzheimer's dementia with other behavioral disturbance, unspecified timing of dementia onset (H)  Comment: progressive decline in language, function and memory     Plan: [facility] for support with  med admin, meals, activity, secure unit.       Expect further decline as she has progressed considerably over past 5 years, per chart notes.      Continue risperidone 0.25 mg bid to help with agitation and paranoia    Talked with pt's   Sukhjinder Higinio by phone, plan of care reviewed.       Advance directive: DNR/DNI, selective treatment per signed AUDIE Tan MD

## 2022-12-05 NOTE — LETTER
12/5/2022        RE: Afia Sylvester  [facility]  [address]  [facility] [address]        Afia Sylvester is a 77 year old female seen December 5, 2022 at [facility] where she has resided for 3 months (admit to LTC 9/2022) seen to follow up dementia with restlessness and anxiety.   Pt is seen on the unit up to chair.   Initially quiet and somewhat interactive, but then later gets up and wanders about the unit, does not interact and is difficult to redirect.      Nursing staff has reported pt has been wandering and intrusive, will take food off other resident's plates at meals.  Family has reported more paranoia and agitation.  Had an incident where she was holding on tightly to another resident.   Risperidone started for these behaviors, and family feels she is improved.   Still wandering though.      By chart review, pt had been living at home with family support but needing more assistance with cares; initially moved to LTC, now transferred over to [facility] unit.  Family still not sure this is the right setting for pt, and are looking at other options.        Pt first saw Dr Teagan Tang for memory changes in 2017   Also had new onset anxiety at that time and started on escitalopram which was reported as helpful by family.      Seen by ENT for hearing loss as well.      In March 2020 pt fell and suffered a left hip fracture, returned home after TCU stay.    Has been treated for osteoporosis with vit D, dietary calcium and yearly Reclast.      Past Medical History:   Diagnosis Date     Anxiety      Dementia (H), dx 2017      History of endometrial cancer, 1998      Osteoporosis     has been on Fosamax in the past    Also treated with Reclast, last dose March 2022      Past Surgical History:   Procedure Laterality Date     HIP SURGERY Left 03/27/2020    left intertrochanteric hip fracture s/p left hip IMN 3/27/20 at [facility]     HYSTERECTOMY TOTAL  ABDOMINAL, BILATERAL SALPINGO-OOPHORECTOMY, NODE DISSECTION, COMBINED  1998    endometrial cancer     KNEE SURGERY  2008    arthroscopic - right     ZZC RAD RESEC TONSIL/PILLARS  1952     SH:  Previously lived at home with her  Sukhjinder as caregiver    is Dr Sukhjinder Sylvester, retired geriatrician     Has 3 daughters: Teagan lives in [place], Kateryna lives in [facility], Dahlia is a retired RN.   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     Non smoker     Review Of Systems  She is ambulatory without device      MoCA 21/30 and CPT 4.8 in 2018    In July 2022 was 132 lb  Wt Readings from Last 5 Encounters:   12/05/22 59.1 kg (130 lb 3.2 oz)   11/29/22 57.3 kg (126 lb 6.4 oz)   11/15/22 58.1 kg (128 lb)   11/10/22 59 kg (130 lb)   10/31/22 57.5 kg (126 lb 12.8 oz)     EXAM: NAD  /76   Pulse 79   Temp 97.9  F (36.6  C)   Resp 18   Ht 1.524 m (5')   Wt 59.1 kg (130 lb 3.2 oz)   LMP  (LMP Unknown)   SpO2 98%   BMI 25.43 kg/m     Neck supple without adenopathy  Lungs clear bilaterally with fair air movement  Heart RRR s1s2   Abd soft, NT, no distention or guarding, +BS  Ext without edema  Neuro: very limited verbal skills, ambulatory with steady gait   No tremor or stiffness  Psych: a little guarded.     Labs reviewed      IMP/PLAN:   (M81.0) Age-related osteoporosis without current pathological fracture    (E55.9) Vitamin D deficiency  Comment: left hip fracture in 2020     Remains ambulatory    Most recent vit D level was 31    Plan: vit D 50 mcg/day and dietary calcium for bone health   Had an IV Reclast infusion in March 2022   Due again March 2023 (will need to go out to clinic for that).        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       Expect further decline as she has progressed considerably over past 5 years, per chart notes.      Continue risperidone 0.25 mg bid to help with agitation and paranoia    Talked with pt's  Dr Sukhjinder Sylvester by phone, plan of care reviewed.       Advance directive: DNR/DNI, selective treatment per signed POLST Milagro Tan MD             Sincerely,        Milagro Tan MD

## 2022-12-15 NOTE — LETTER
12/15/2022        RE: Afia Sylvester  [facility]  [address]  [facility] [address]        [facility] GERIATRICS DISCHARGE SUMMARY  PATIENT'S NAME: Afia Sylvester  YOB: 1945  MEDICAL RECORD NUMBER:  [id number]  Place of Service where encounter took place:  [facility] ([facility] [[id number]]    PRIMARY CARE PROVIDER AND CLINIC RESPONSIBLE AFTER TRANSFER:    Non-G Provider     Transferring providers: JANELLE Bejarano CNP; Milagro Tan MD  Date of SNF Admission: September 26, 2022  Then transferred to [facility] on 11/7/2022  Date of SNF (anticipated) Discharge: 12/19/2022  Discharged to: new assisted living for patient   Cognitive Scores: pt not able to participate in BIMS due to cognitive impairment  Physical Function: wanders independently  DME: none    CODE STATUS/ADVANCE DIRECTIVES DISCUSSION:  No CPR- Do NOT Intubate   ALLERGIES: No known allergies    NURSING FACILITY COURSE   Medication Changes/Rationale:   PT was admitted to the LTC portion of [facility] and discharged to the [facility] portion of [facility] where the activity level was more appropriate.   During LTC stay her Aricept was stopped.      On 11/10/2022, patient was observed to be pacing the secured dementia unit and going into other peoples rooms.  She was talking in nonsensical manner.   She had an ECG done which revealed a QTC less than 500 ms and she was started on Celexa 5 mg daily and her Lexapro was decreased to 10 mg daily x3 days then subsequently discontinued.    On 11/17/2022, she was started on hydroxyzine 12.5 mg BID and prn    On 11/29/2022 her hydroxyzine was discontinued due was started to dementia with behavior including restlessness and anxiety.  Wanders around the unit, does not interact with others, difficult to redirect, intrusive of others space, and had an incident where she was holding on tightly to another resident.   Risperidone 0.25 mg BIDs started for these  behaviors, and family felt she improved slightly.     Summary of nursing facility stay:    (R45.1) Agitation  (primary encounter diagnosis)  (R46.89) Wandering  (G30.9,  F02.C18) Severe Alzheimer's dementia with other behavioral disturbance, unspecified timing of dementia onset (H)  (F41.9) Anxiety  Comment: progressive on going.  Afia is not able to communicate her needs.  Pt is currently taking citalopram 10 mg daily low dose rispirdone which may have helped slightly.  However pt continues to have behaviors of wandering independently in the hallways and at times in others rooms.  Unfortunately she did have a falls on 12/9/2022 during the night.       (M81.8) Other osteoporosis without current pathological fracture  (E55.9) Vitamin D deficiency  (M81.0) Age-related osteoporosis without current pathological fracture  Comment: has a hx of left hip fracture in 2020 and now ambulates independently.  She currently taking Vitamin D, Had an IV Reclast infusion in March 2022   Due again March 2023 (will need to go out to clinic for that).     Recent vit D level was 31          Discharge Medications:  MED REC REQUIRED  Post Medication Reconciliation Status:  Discharge medications reconciled, continue medications without change       Current Outpatient Medications   Medication Sig Dispense Refill     citalopram (CELEXA) 10 MG tablet Take 10 mg by mouth daily       polyethylene glycol (MIRALAX) 17 GM/Dose powder Take 17 g by mouth every other day 510 g      risperiDONE (RISPERDAL) 0.25 MG tablet Take 1 tablet (0.25 mg) by mouth 2 times daily       vitamin D3 (CHOLECALCIFEROL) 50 mcg (2000 units) tablet Take 1 tablet (50 mcg) by mouth three times a week            Controlled medications:   not applicable/none     Past Medical History:   Past Medical History:   Diagnosis Date     Anxiety      Dementia (H)      History of endometrial cancer      Osteoporosis     has been on Fosamax in the past     Physical Exam:   Vitals: BP  124/76   Pulse 79   Temp 97.9  F (36.6  C)   Resp 18   Ht 1.524 m (5')   Wt 59.1 kg (130 lb 4.8 oz)   LMP  (LMP Unknown)   SpO2 98%   BMI 25.45 kg/m    BMI: Body mass index is 25.45 kg/m .  GENERAL APPEARANCE:  Alert, in no distress  RESP:  no respiratory distress  PSYCH:  wandering, nonsensical talking, oriented x 1     SNF labs:     Most Recent 3 CBC's:Recent Labs   Lab Test 11/23/21  1645 11/18/20  1218 09/11/17  1712   WBC 6.2  --   --    HGB 13.4 12.6 12.5   MCV 96  --   --      --   --      Most Recent 3 BMP's:  Recent Labs   Lab Test 11/23/21  1645 11/18/20  1218 09/11/17  1712    140 142   POTASSIUM 4.4 4.3 4.2   CHLORIDE 106 108 108   CO2 29 26 28   BUN 16 15 23   CR 0.68 0.75 0.64   ANIONGAP 4 6 6   PAOLA 9.3 9.3 9.0   GLC 92 79 99     Most Recent 2 LFT's:  Recent Labs   Lab Test 11/23/21  1645 09/11/17  1712   AST 16 21   ALT 18 31   ALKPHOS 99 75   BILITOTAL 0.3 0.3     Most Recent Cholesterol Panel:  Recent Labs   Lab Test 11/23/21  1645   CHOL 227*   *   HDL 57   TRIG 93     Most Recent TSH and T4:  Recent Labs   Lab Test 11/23/21  1645   TSH 2.02       DISCHARGE PLAN:    Follow up labs: recommend BMP, CBC    Medical Follow Up:      Follow up with primary care provider in 1-2 weeks    [facility] scheduled appointments: none    Discharge Services: pt going to memory care home    Discharge Instructions Verbalized to Patient at Discharge:     None    TOTAL DISCHARGE TIME:   Greater than 30 minutes  Electronically signed by:  JANELLE Dela Cruz CNP                     Sincerely,        JANELLE Dela Cruz CNP

## 2022-12-15 NOTE — PROGRESS NOTES
[facility] GERIATRICS DISCHARGE SUMMARY  PATIENT'S NAME: Afia Sylvester  YOB: 1945  MEDICAL RECORD NUMBER:  [id number]  Place of Service where encounter took place:  [facility] ([facility] [[id number]]    PRIMARY CARE PROVIDER AND CLINIC RESPONSIBLE AFTER TRANSFER:    Non-[facility] Provider     Transferring providers: JANELLE Bejarano CNP; Milagro Tan MD  Date of SNF Admission: September 26, 2022  Then transferred to [facility] on 11/7/2022  Date of SNF (anticipated) Discharge: 12/19/2022  Discharged to: new assisted living for patient   Cognitive Scores: pt not able to participate in BIMS due to cognitive impairment  Physical Function: wanders independently  DME: none    CODE STATUS/ADVANCE DIRECTIVES DISCUSSION:  No CPR- Do NOT Intubate   ALLERGIES: No known allergies    NURSING FACILITY COURSE   Medication Changes/Rationale:   PT was admitted to the LTC portion of [facility] and discharged to the [facility] portion of [facility] where the activity level was more appropriate.   During LTC stay her Aricept was stopped.      On 11/10/2022, patient was observed to be pacing the secured dementia unit and going into other peoples rooms.  She was talking in nonsensical manner.   She had an ECG done which revealed a QTC less than 500 ms and she was started on Celexa 5 mg daily and her Lexapro was decreased to 10 mg daily x3 days then subsequently discontinued.    On 11/17/2022, she was started on hydroxyzine 12.5 mg BID and prn    On 11/29/2022 her hydroxyzine was discontinued due was started to dementia with behavior including restlessness and anxiety.  Wanders around the unit, does not interact with others, difficult to redirect, intrusive of others space, and had an incident where she was holding on tightly to another resident.   Risperidone 0.25 mg BIDs started for these behaviors, and family felt she improved slightly.     Summary of nursing facility stay:    (R45.1) Agitation  (primary  encounter diagnosis)  (R46.89) Wandering  (G30.9,  F02.C18) Severe Alzheimer's dementia with other behavioral disturbance, unspecified timing of dementia onset (H)  (F41.9) Anxiety  Comment: progressive on going.  Afia is not able to communicate her needs.  Pt is currently taking citalopram 10 mg daily low dose rispirdone which may have helped slightly.  However pt continues to have behaviors of wandering independently in the hallways and at times in others rooms.  Unfortunately she did have a falls on 12/9/2022 during the night.       (M81.8) Other osteoporosis without current pathological fracture  (E55.9) Vitamin D deficiency  (M81.0) Age-related osteoporosis without current pathological fracture  Comment: has a hx of left hip fracture in 2020 and now ambulates independently.  She currently taking Vitamin D, Had an IV Reclast infusion in March 2022   Due again March 2023 (will need to go out to clinic for that).     Recent vit D level was 31          Discharge Medications:  MED REC REQUIRED  Post Medication Reconciliation Status:  Discharge medications reconciled, continue medications without change       Current Outpatient Medications   Medication Sig Dispense Refill     citalopram (CELEXA) 10 MG tablet Take 10 mg by mouth daily       polyethylene glycol (MIRALAX) 17 GM/Dose powder Take 17 g by mouth every other day 510 g      risperiDONE (RISPERDAL) 0.25 MG tablet Take 1 tablet (0.25 mg) by mouth 2 times daily       vitamin D3 (CHOLECALCIFEROL) 50 mcg (2000 units) tablet Take 1 tablet (50 mcg) by mouth three times a week            Controlled medications:   not applicable/none     Past Medical History:   Past Medical History:   Diagnosis Date     Anxiety      Dementia (H)      History of endometrial cancer      Osteoporosis     has been on Fosamax in the past     Physical Exam:   Vitals: /76   Pulse 79   Temp 97.9  F (36.6  C)   Resp 18   Ht 1.524 m (5')   Wt 59.1 kg (130 lb 4.8 oz)   LMP  (LMP  Unknown)   SpO2 98%   BMI 25.45 kg/m    BMI: Body mass index is 25.45 kg/m .  GENERAL APPEARANCE:  Alert, in no distress  RESP:  no respiratory distress  PSYCH:  wandering, nonsensical talking, oriented x 1     SNF labs:     Most Recent 3 CBC's:Recent Labs   Lab Test 11/23/21  1645 11/18/20  1218 09/11/17  1712   WBC 6.2  --   --    HGB 13.4 12.6 12.5   MCV 96  --   --      --   --      Most Recent 3 BMP's:  Recent Labs   Lab Test 11/23/21  1645 11/18/20  1218 09/11/17  1712    140 142   POTASSIUM 4.4 4.3 4.2   CHLORIDE 106 108 108   CO2 29 26 28   BUN 16 15 23   CR 0.68 0.75 0.64   ANIONGAP 4 6 6   PAOLA 9.3 9.3 9.0   GLC 92 79 99     Most Recent 2 LFT's:  Recent Labs   Lab Test 11/23/21  1645 09/11/17  1712   AST 16 21   ALT 18 31   ALKPHOS 99 75   BILITOTAL 0.3 0.3     Most Recent Cholesterol Panel:  Recent Labs   Lab Test 11/23/21  1645   CHOL 227*   *   HDL 57   TRIG 93     Most Recent TSH and T4:  Recent Labs   Lab Test 11/23/21  1645   TSH 2.02       DISCHARGE PLAN:    Follow up labs: recommend BMP, CBC    Medical Follow Up:      Follow up with primary care provider in 1-2 weeks    [facility] scheduled appointments: none    Discharge Services: pt going to memory care home    Discharge Instructions Verbalized to Patient at Discharge:     None    TOTAL DISCHARGE TIME:   Greater than 30 minutes  Electronically signed by:  JANELLE Dela Cruz CNP

## 2022-12-21 NOTE — PROGRESS NOTES
[place] [facility]  PRIMARY CARE PROVIDER AND CLINIC:  Chris Mancuso, JANELLE CNP, [address] [facility] [address]  Chief Complaint   Patient presents with     Establish Care     [place] Medical Record Number:  [id number]  Place of Service where encounter took place:  [facility] ([facility]) [233]    Afia Sylvester  is a 77 year old  (1945), admitted to the above facility from  [facility] . Hospital stay 09/26/2022 through 12/19/2022..  Admitted to this facility for  medical management and nursing care.    HPI:    HPI information obtained from: facility chart records, facility staff, [facility] chart review and family/first contact spouse report.   Brief Summary of Hospital Course:     Depression: cont. On celexa. Tapered off lexapro,. Per spouse, has had improved mood since transfer to [place], now smiles at times. Po intake stable.     Alzheimer's: h/o wandering, agitation. On aricept in past. Had prn hydroxyzine in past.  Last month started on risperdal which has been more effective.     OP: has taken fosamax in past. Currently taking Vit D, yearly reclast.infusion.     Constipation: cont. On every other day miralax. No recent increase in s/s.         Updates on Status Since Skilled nursing Admission: behaviors gen.stable. no reports of insomnia    CODE STATUS/ADVANCE DIRECTIVES DISCUSSION:   DNR / DNI   Patient's living condition: lives with spouse  ALLERGIES: No known allergies  PAST MEDICAL HISTORY:  has a past medical history of Anxiety, Dementia (H), History of endometrial cancer, and Osteoporosis.  PAST SURGICAL HISTORY:   has a past surgical history that includes Hysterectomy total abdominal, bilateral salpingo-oophorectomy, node dissection, combined (1998); knee surgery (2008); RAD RESEC TONSIL/PILLARS (1952); and hip surgery (Left, 03/27/2020).  FAMILY HISTORY: family history includes Heart Disease in her mother; Neurologic Disorder in her father.  SOCIAL  HISTORY:   reports that she has never smoked. She has never used smokeless tobacco. She reports that she does not currently use alcohol. She reports that she does not use drugs.    Post Discharge Medication Reconciliation Status: discharge medications reconciled, continue medications without change    Current Outpatient Medications   Medication Sig Dispense Refill     citalopram (CELEXA) 10 MG tablet Take 10 mg by mouth daily       polyethylene glycol (MIRALAX) 17 GM/Dose powder Take 17 g by mouth every other day 510 g      risperiDONE (RISPERDAL) 0.25 MG tablet Take 1 tablet (0.25 mg) by mouth 2 times daily       vitamin D3 (CHOLECALCIFEROL) 50 mcg (2000 units) tablet Take 1 tablet (50 mcg) by mouth three times a week           ROS:  Unobtainable secondary to cognitive impairment.     Vitals:  /78   Pulse 76   Temp 98  F (36.7  C)   Resp 18   Wt 59 kg (130 lb)   LMP  (LMP Unknown)   SpO2 95%   BMI 25.39 kg/m    Exam:  GENERAL APPEARANCE:  Alert, in no distress, cooperative  ENT:  Mouth and posterior oropharynx normal, moist mucous membranes, [place], adequate dentition  EYES:  EOM, conjunctivae, lids, pupils and irises normal, PERRL, no drainage  NECK:  No adenopathy,masses or thyromegaly, no carotid bruit  RESP:  respiratory effort and palpation of chest normal, lungs clear to auscultation , no respiratory distress  CV:  Palpation and auscultation of heart done , regular rate and rhythm, no murmur, rub, or gallop, no edema  ABDOMEN:  normal bowel sounds, soft, nontender, no hepatosplenomegaly or other masses, no guarding or rebound  M/S:   Gait and station normal  muscle strength 5/5 all 4 ext.  NEURO:   Cranial nerves 2-12 are normal tested and grossly at patient's baseline, no tremor seen  PSYCH:  affect and mood normal, no apparent anxiety    Lab/Diagnostic data:    Most Recent 3 CBC's:Recent Labs   Lab Test 11/23/21  1645 11/18/20  1218 09/11/17  1712   WBC 6.2  --   --    HGB 13.4 12.6 12.5   MCV 96   --   --      --   --      Most Recent 3 BMP's:Recent Labs   Lab Test 11/23/21  1645 11/18/20  1218 09/11/17  1712    140 142   POTASSIUM 4.4 4.3 4.2   CHLORIDE 106 108 108   CO2 29 26 28   BUN 16 15 23   CR 0.68 0.75 0.64   ANIONGAP 4 6 6   PAOLA 9.3 9.3 9.0   GLC 92 79 99       ASSESSMENT/PLAN:  (F32.0) Current mild episode of major depressive disorder without prior episode (H)  (primary encounter diagnosis)  Comment: mood more stable past couple weeks per spouse  Plan: 1. Cont. celexa  2. Cont. To invite to activities throughout the day  3. Monitor for insomnia    (G30.9,  F02.C18) Severe Alzheimer's dementia with other behavioral disturbance, unspecified timing of dementia onset (H)  Comment: behaviors more stable since start of risperdal  Plan: 1. Cont. risperdal  2. Cont. Secured memory care unit  3. Monitor for increased anxiety, agitation, wandering  4. If behaviors stable over next couple mos, may trial GDR risperdal    (M81.8) Other osteoporosis without current pathological fracture  Comment: s/p L hip fx, vit. D def.  Plan: 1. Cont. Vit. D  2. Cont. Annual reclast infusion    (K59.00) Constipation, unspecified constipation type  Comment: currently stable  Plan: 1. Cont. Every other day miralax  2. Encourage fluids  3. For increase in s/s, may increase miralax to qd    (Z71.89) ACP (advance care planning)  Comment: DNR, DNI  Plan: POLST in chart  Electronically signed by:  JANELLE Mills CNP

## 2022-12-21 NOTE — LETTER
12/21/2022        RE: Afia Campo [facility]  [address] [place]  [facility] [address]        [place] [facility]  PRIMARY CARE PROVIDER AND CLINIC:  JANELLE Mills [facility], [address] [facility] [address]  Chief Complaint   Patient presents with     Establish Care     [place] Medical Record Number:  [id number]  Place of Service where encounter took place:  [facility] (detention) [233]    Afia Sylvester  is a 77 year old  (1945), admitted to the above facility from  [facility] . Hospital stay 09/26/2022 through 12/19/2022..  Admitted to this facility for  medical management and nursing care.    HPI:    HPI information obtained from: facility chart records, facility staff, [facility] chart review and family/first contact spouse report.   Brief Summary of Hospital Course:     Depression: cont. On celexa. Tapered off lexapro,. Per spouse, has had improved mood since transfer to AL, now smiles at times. Po intake stable.     Alzheimer's: h/o wandering, agitation. On aricept in past. Had prn hydroxyzine in past.  Last month started on risperdal which has been more effective.     OP: has taken fosamax in past. Currently taking Vit D, yearly reclast.infusion.     Constipation: cont. On every other day miralax. No recent increase in s/s.         Updates on Status Since Skilled nursing Admission: behaviors gen.stable. no reports of insomnia    CODE STATUS/ADVANCE DIRECTIVES DISCUSSION:   DNR / DNI   Patient's living condition: lives with spouse  ALLERGIES: No known allergies  PAST MEDICAL HISTORY:  has a past medical history of Anxiety, Dementia (H), History of endometrial cancer, and Osteoporosis.  PAST SURGICAL HISTORY:   has a past surgical history that includes Hysterectomy total abdominal, bilateral salpingo-oophorectomy, node dissection, combined (1998); knee surgery (2008); RAD RESEC TONSIL/PILLARS (1952); and hip surgery (Left,  03/27/2020).  FAMILY HISTORY: family history includes Heart Disease in her mother; Neurologic Disorder in her father.  SOCIAL HISTORY:   reports that she has never smoked. She has never used smokeless tobacco. She reports that she does not currently use alcohol. She reports that she does not use drugs.    Post Discharge Medication Reconciliation Status: discharge medications reconciled, continue medications without change    Current Outpatient Medications   Medication Sig Dispense Refill     citalopram (CELEXA) 10 MG tablet Take 10 mg by mouth daily       polyethylene glycol (MIRALAX) 17 GM/Dose powder Take 17 g by mouth every other day 510 g      risperiDONE (RISPERDAL) 0.25 MG tablet Take 1 tablet (0.25 mg) by mouth 2 times daily       vitamin D3 (CHOLECALCIFEROL) 50 mcg (2000 units) tablet Take 1 tablet (50 mcg) by mouth three times a week           ROS:  Unobtainable secondary to cognitive impairment.     Vitals:  /78   Pulse 76   Temp 98  F (36.7  C)   Resp 18   Wt 59 kg (130 lb)   LMP  (LMP Unknown)   SpO2 95%   BMI 25.39 kg/m    Exam:  GENERAL APPEARANCE:  Alert, in no distress, cooperative  ENT:  Mouth and posterior oropharynx normal, moist mucous membranes, [place], adequate dentition  EYES:  EOM, conjunctivae, lids, pupils and irises normal, PERRL, no drainage  NECK:  No adenopathy,masses or thyromegaly, no carotid bruit  RESP:  respiratory effort and palpation of chest normal, lungs clear to auscultation , no respiratory distress  CV:  Palpation and auscultation of heart done , regular rate and rhythm, no murmur, rub, or gallop, no edema  ABDOMEN:  normal bowel sounds, soft, nontender, no hepatosplenomegaly or other masses, no guarding or rebound  M/S:   Gait and station normal  muscle strength 5/5 all 4 ext.  NEURO:   Cranial nerves 2-12 are normal tested and grossly at patient's baseline, no tremor seen  PSYCH:  affect and mood normal, no apparent anxiety    Lab/Diagnostic data:    Most Recent  3 CBC's:Recent Labs   Lab Test 11/23/21  1645 11/18/20  1218 09/11/17  1712   WBC 6.2  --   --    HGB 13.4 12.6 12.5   MCV 96  --   --      --   --      Most Recent 3 BMP's:Recent Labs   Lab Test 11/23/21  1645 11/18/20  1218 09/11/17  1712    140 142   POTASSIUM 4.4 4.3 4.2   CHLORIDE 106 108 108   CO2 29 26 28   BUN 16 15 23   CR 0.68 0.75 0.64   ANIONGAP 4 6 6   PAOLA 9.3 9.3 9.0   GLC 92 79 99       ASSESSMENT/PLAN:  (F32.0) Current mild episode of major depressive disorder without prior episode (H)  (primary encounter diagnosis)  Comment: mood more stable past couple weeks per spouse  Plan: 1. Cont. celexa  2. Cont. To invite to activities throughout the day  3. Monitor for insomnia    (G30.9,  F02.C18) Severe Alzheimer's dementia with other behavioral disturbance, unspecified timing of dementia onset (H)  Comment: behaviors more stable since start of risperdal  Plan: 1. Cont. risperdal  2. Cont. Secured memory care unit  3. Monitor for increased anxiety, agitation, wandering  4. If behaviors stable over next couple mos, may trial GDR risperdal    (M81.8) Other osteoporosis without current pathological fracture  Comment: s/p L hip fx, vit. D def.  Plan: 1. Cont. Vit. D  2. Cont. Annual reclast infusion    (K59.00) Constipation, unspecified constipation type  Comment: currently stable  Plan: 1. Cont. Every other day miralax  2. Encourage fluids  3. For increase in s/s, may increase miralax to qd    (Z71.89) ACP (advance care planning)  Comment: DNR, DNI  Plan: POLST in chart  Electronically signed by:  JANELLE Mills CNP                         Sincerely,        JANELLE Mills CNP

## 2023-01-01 ENCOUNTER — TELEPHONE (OUTPATIENT)
Dept: GERIATRICS | Facility: CLINIC | Age: 78
End: 2023-01-01
Payer: COMMERCIAL

## 2023-01-01 ENCOUNTER — ASSISTED LIVING VISIT (OUTPATIENT)
Dept: GERIATRICS | Facility: CLINIC | Age: 78
End: 2023-01-01
Payer: COMMERCIAL

## 2023-01-01 ENCOUNTER — PATIENT OUTREACH (OUTPATIENT)
Dept: GERIATRIC MEDICINE | Facility: CLINIC | Age: 78
End: 2023-01-01

## 2023-01-01 ENCOUNTER — HEALTH MAINTENANCE LETTER (OUTPATIENT)
Age: 78
End: 2023-01-01

## 2023-01-01 ENCOUNTER — MEDICAL CORRESPONDENCE (OUTPATIENT)
Dept: HEALTH INFORMATION MANAGEMENT | Facility: CLINIC | Age: 78
End: 2023-01-01
Payer: COMMERCIAL

## 2023-01-01 ENCOUNTER — TELEPHONE (OUTPATIENT)
Dept: GERIATRICS | Facility: CLINIC | Age: 78
End: 2023-01-01

## 2023-01-01 VITALS
SYSTOLIC BLOOD PRESSURE: 129 MMHG | DIASTOLIC BLOOD PRESSURE: 77 MMHG | HEART RATE: 78 BPM | RESPIRATION RATE: 16 BRPM | TEMPERATURE: 97.6 F | OXYGEN SATURATION: 94 %

## 2023-01-01 VITALS
HEART RATE: 73 BPM | SYSTOLIC BLOOD PRESSURE: 129 MMHG | DIASTOLIC BLOOD PRESSURE: 67 MMHG | OXYGEN SATURATION: 95 % | RESPIRATION RATE: 18 BRPM

## 2023-01-01 VITALS
SYSTOLIC BLOOD PRESSURE: 131 MMHG | HEART RATE: 61 BPM | TEMPERATURE: 97.6 F | RESPIRATION RATE: 16 BRPM | OXYGEN SATURATION: 95 % | DIASTOLIC BLOOD PRESSURE: 69 MMHG

## 2023-01-01 VITALS
SYSTOLIC BLOOD PRESSURE: 122 MMHG | OXYGEN SATURATION: 94 % | RESPIRATION RATE: 18 BRPM | HEART RATE: 73 BPM | DIASTOLIC BLOOD PRESSURE: 70 MMHG

## 2023-01-01 DIAGNOSIS — G30.9 SEVERE ALZHEIMER'S DEMENTIA WITH OTHER BEHAVIORAL DISTURBANCE, UNSPECIFIED TIMING OF DEMENTIA ONSET (H): ICD-10-CM

## 2023-01-01 DIAGNOSIS — F51.01 PRIMARY INSOMNIA: ICD-10-CM

## 2023-01-01 DIAGNOSIS — F02.C18 SEVERE ALZHEIMER'S DEMENTIA WITH OTHER BEHAVIORAL DISTURBANCE, UNSPECIFIED TIMING OF DEMENTIA ONSET (H): ICD-10-CM

## 2023-01-01 DIAGNOSIS — R53.83 LETHARGY: ICD-10-CM

## 2023-01-01 DIAGNOSIS — R53.83 LETHARGY: Primary | ICD-10-CM

## 2023-01-01 DIAGNOSIS — R32 URINARY INCONTINENCE, UNSPECIFIED TYPE: ICD-10-CM

## 2023-01-01 DIAGNOSIS — R52 PAIN: Primary | ICD-10-CM

## 2023-01-01 DIAGNOSIS — F51.01 PRIMARY INSOMNIA: Primary | ICD-10-CM

## 2023-01-01 DIAGNOSIS — R52 PAIN: ICD-10-CM

## 2023-01-01 DIAGNOSIS — F41.9 ANXIETY: Primary | ICD-10-CM

## 2023-01-01 PROCEDURE — 99349 HOME/RES VST EST MOD MDM 40: CPT | Performed by: NURSE PRACTITIONER

## 2023-01-01 RX ORDER — LORAZEPAM 0.5 MG/1
0.5 TABLET ORAL EVERY 4 HOURS PRN
Qty: 60 TABLET | Refills: 5 | Status: SHIPPED | OUTPATIENT
Start: 2023-01-01

## 2023-01-01 RX ORDER — MORPHINE SULFATE 10 MG/5ML
2.5 SOLUTION ORAL EVERY 4 HOURS PRN
Qty: 30 ML | Refills: 0 | Status: SHIPPED | OUTPATIENT
Start: 2023-01-01 | End: 2023-01-01

## 2023-01-01 RX ORDER — TRAMADOL HYDROCHLORIDE 50 MG/1
TABLET ORAL
Qty: 60 TABLET | Refills: 5 | Status: SHIPPED | OUTPATIENT
Start: 2023-01-01 | End: 2023-09-08

## 2023-01-01 RX ORDER — AMOXICILLIN 250 MG
1 CAPSULE ORAL DAILY
COMMUNITY

## 2023-01-01 RX ORDER — MORPHINE SULFATE 10 MG/5ML
5 SOLUTION ORAL
COMMUNITY

## 2023-01-01 RX ORDER — MIRTAZAPINE 7.5 MG/1
7.5 TABLET, FILM COATED ORAL AT BEDTIME
COMMUNITY
End: 2023-01-01

## 2023-01-01 RX ORDER — CIPROFLOXACIN 250 MG/1
250 TABLET, FILM COATED ORAL 2 TIMES DAILY
COMMUNITY
Start: 2023-01-01 | End: 2023-01-01

## 2023-01-06 NOTE — PROGRESS NOTES
[facility] Care Coordination Contact    Member became effective with [facility] on 1/1/2023 with [employer].     Welcome letter sent to spouse Edwin ryann SPENCER.     Karen Piper  Care Management Specialist   [facility]   [phone]

## 2023-01-06 NOTE — PROGRESS NOTES
[place] [facility]  [place] Medical Record Number:  [id number]  Place of Service where encounter took place:  [facility] ([facility]) [233]  Chief Complaint   Patient presents with     Insomnia       HPI:    Afia Sylvester  is a 77 year old (1945), who is being seen today for an episodic care visit.  HPI information obtained from: facility chart records, facility staff, [facility] chart review and family/first contact spouse report. Today's concern is: insomnia, alzheimer's, urinary incont. Has had ongoing insomnia. Has alzheimer's. Wanders throughout the day. Wanders much of hs.occ daytime nap. Cont. On risperdal, celexa. No reports of agitation. Mood gen. Stable, responds to staff redirection. Has ongoing urinary incont. Wears pull up brief. Per spouse report, can bee difficulty to change-must remove pants, shoes.       Past Medical and Surgical History reviewed in Epic today.    MEDICATIONS:    Current Outpatient Medications   Medication Sig Dispense Refill     mirtazapine (REMERON) 7.5 MG tablet Take 7.5 mg by mouth At Bedtime       citalopram (CELEXA) 10 MG tablet Take 10 mg by mouth daily       polyethylene glycol (MIRALAX) 17 GM/Dose powder Take 17 g by mouth every other day 510 g      risperiDONE (RISPERDAL) 0.25 MG tablet Take 1 tablet (0.25 mg) by mouth 2 times daily       vitamin D3 (CHOLECALCIFEROL) 50 mcg (2000 units) tablet Take 1 tablet (50 mcg) by mouth three times a week           REVIEW OF SYSTEMS:  Unobtainable secondary to cognitive impairment.     Objective:  /70   Pulse 73   Resp 18   LMP  (LMP Unknown)   SpO2 94%   Exam:  GENERAL APPEARANCE:  Alert, cooperative  ENT:  Mouth and posterior oropharynx normal, moist mucous membranes, [place]  EYES:  EOM, conjunctivae, lids, pupils and irises normal, PERRL  RESP:  respiratory effort and palpation of chest normal, lungs clear to auscultation , no respiratory distress  CV:  Palpation and auscultation of heart done  , regular rate and rhythm, no murmur, rub, or gallop, no edema  ABDOMEN:  normal bowel sounds, soft, nontender, no hepatosplenomegaly or other masses, no guarding or rebound  M/S:   Gait and station normal  muscle strength 5/5 all 4 ext.  NEURO:   Cranial nerves 2-12 are normal tested and grossly at patient's baseline, no tremor  PSYCH:  affect abnormal flat, no apparent anxiety    Labs:     Most Recent 3 BMP's:Recent Labs   Lab Test 11/23/21  1645 11/18/20  1218 09/11/17  1712    140 142   POTASSIUM 4.4 4.3 4.2   CHLORIDE 106 108 108   CO2 29 26 28   BUN 16 15 23   CR 0.68 0.75 0.64   ANIONGAP 4 6 6   PAOLA 9.3 9.3 9.0   GLC 92 79 99       ASSESSMENT/PLAN:  (F51.01) Primary insomnia  (primary encounter diagnosis)  Comment: ongoing s/s. Wandering at hs  Plan: 1. Add hs remeron 7.5 mg  2. Reassess s/s over next couple weeks  3. For ongoing s/s may increase remeron, consider adding trazodone  4. Monitor for reports of daytime sleepiness    (G30.9,  F02.C18) Severe Alzheimer's dementia with other behavioral disturbance, unspecified timing of dementia onset (H)  Comment: memory loss. Freq. Wandering, increased anxiety at times  Plan: 1. Cont. risperdal  2. Cont. celexa  3. Reassess mood, behaviors over next few weeks, is stable may discontinue celexa given start of HS remeron    (R32) Urinary incontinence, unspecified type  Comment: ongoing. Requires pull up change during the day at times, does remove per self at times.  Plan: 1. Change pull up brief to tabbed brief  2. Cont. To assist with brief changes    Electronically signed by:  JANELLE Mills CNP

## 2023-01-06 NOTE — LETTER
1/6/2023        RE: Afia Sylvester  C/o Edwin Sylvester  [address]  [facility] [place] [address]        [place] [facility]  [place] Medical Record Number:  [id number]  Place of Service where encounter took place:  [facility] ([facility]) [233]  Chief Complaint   Patient presents with     Insomnia       HPI:    Afia Sylvester  is a 77 year old (1945), who is being seen today for an episodic care visit.  HPI information obtained from: facility chart records, facility staff, [facility] chart review and family/first contact spouse report. Today's concern is: insomnia, alzheimer's, urinary incont. Has had ongoing insomnia. Has alzheimer's. Wanders throughout the day. Wanders much of hs.occ daytime nap. Cont. On risperdal, celexa. No reports of agitation. Mood gen. Stable, responds to staff redirection. Has ongoing urinary incont. Wears pull up brief. Per spouse report, can bee difficulty to change-must remove pants, shoes.       Past Medical and Surgical History reviewed in Epic today.    MEDICATIONS:    Current Outpatient Medications   Medication Sig Dispense Refill     mirtazapine (REMERON) 7.5 MG tablet Take 7.5 mg by mouth At Bedtime       citalopram (CELEXA) 10 MG tablet Take 10 mg by mouth daily       polyethylene glycol (MIRALAX) 17 GM/Dose powder Take 17 g by mouth every other day 510 g      risperiDONE (RISPERDAL) 0.25 MG tablet Take 1 tablet (0.25 mg) by mouth 2 times daily       vitamin D3 (CHOLECALCIFEROL) 50 mcg (2000 units) tablet Take 1 tablet (50 mcg) by mouth three times a week           REVIEW OF SYSTEMS:  Unobtainable secondary to cognitive impairment.     Objective:  /70   Pulse 73   Resp 18   LMP  (LMP Unknown)   SpO2 94%   Exam:  GENERAL APPEARANCE:  Alert, cooperative  ENT:  Mouth and posterior oropharynx normal, moist mucous membranes, [place]  EYES:  EOM, conjunctivae, lids, pupils and irises normal, PERRL  RESP:  respiratory effort and palpation of  chest normal, lungs clear to auscultation , no respiratory distress  CV:  Palpation and auscultation of heart done , regular rate and rhythm, no murmur, rub, or gallop, no edema  ABDOMEN:  normal bowel sounds, soft, nontender, no hepatosplenomegaly or other masses, no guarding or rebound  M/S:   Gait and station normal  muscle strength 5/5 all 4 ext.  NEURO:   Cranial nerves 2-12 are normal tested and grossly at patient's baseline, no tremor  PSYCH:  affect abnormal flat, no apparent anxiety    Labs:     Most Recent 3 BMP's:Recent Labs   Lab Test 11/23/21  1645 11/18/20  1218 09/11/17  1712    140 142   POTASSIUM 4.4 4.3 4.2   CHLORIDE 106 108 108   CO2 29 26 28   BUN 16 15 23   CR 0.68 0.75 0.64   ANIONGAP 4 6 6   PAOLA 9.3 9.3 9.0   GLC 92 79 99       ASSESSMENT/PLAN:  (F51.01) Primary insomnia  (primary encounter diagnosis)  Comment: ongoing s/s. Wandering at hs  Plan: 1. Add hs remeron 7.5 mg  2. Reassess s/s over next couple weeks  3. For ongoing s/s may increase remeron, consider adding trazodone  4. Monitor for reports of daytime sleepiness    (G30.9,  F02.C18) Severe Alzheimer's dementia with other behavioral disturbance, unspecified timing of dementia onset (H)  Comment: memory loss. Freq. Wandering, increased anxiety at times  Plan: 1. Cont. risperdal  2. Cont. celexa  3. Reassess mood, behaviors over next few weeks, is stable may discontinue celexa given start of HS remeron    (R32) Urinary incontinence, unspecified type  Comment: ongoing. Requires pull up change during the day at times, does remove per self at times.  Plan: 1. Change pull up brief to tabbed brief  2. Cont. To assist with brief changes    Electronically signed by:  JANELLE Mills CNP               Sincerely,        JANELLE Mills CNP

## 2023-01-06 NOTE — LETTER
January 6, 2023      JORDAN BRIGGS  C/O CHITO BRIGGS  [address]   [facility] [address]      Dear Jordan:    Valeria, my name is Marina Zuniga MA, LSW. You are eligible for [facility] Case Management program through your enrollment in [employer]. We think you may benefit from this program. I am writing to invite you to be in our Case Management program.     The following are a few things the Case Management program can help you with:    Select or change your primary care doctor or primary care clinic    Find a specialist, if needed, near your home    Receive preventive care, such as flu shots    Join programs offered by [facility] that interest you, like wellness programs    As your , I will do the following to enroll you in the Case Management Program:    Schedule a telephone call with you to answer any questions you may have about case management    Conduct an assessment by phone to identify needs case management can help you with    Develop a care plan to address those needs    Help you obtain available care and resources as needed    I will call you soon to discuss your interest in this program and your health care needs.    Being in the Case Management program is voluntary and offered to you at no cost. If you accept being in the Case Management program, you can stop any time by calling me at [phone].    Sincerely,      Marina Zuniga MA, LSW    E-mail: [email or web address]  Phone: [phone]      [facility]    [id number]                                     (01/2020)          Formerly named [facility] [place] [address]  [phone]  fax [phone]  [facility]

## 2023-01-18 NOTE — PROGRESS NOTES
Gons[facility] [employer] Chart review      chart   No triggering events at this time   CM will follow-up as needed     Marina Zuniga MA [facility] Care Coordinator   [phone] - jgor cell phone   [phone] - work fax

## 2023-04-12 NOTE — PROGRESS NOTES
[place] [facility]  [place] Medical Record Number:  [id number]  Place of Service where encounter took place:  [facility] ([facility]) [233]  Chief Complaint   Patient presents with     RECHECK     Fatigue       HPI:    Afia Sylvester  is a 77 year old (1945), who is being seen today for an episodic care visit.  HPI information obtained from: facility chart records, facility staff, [facility] chart review and family/first contact spouse report. Today's concern is: lethargy, alzheimer's.  Has alzheimer's with anxiety, wandering. Cont. On risperdal. Has not had aggressive behaviors, responds to staff redirection. Cont. On remeron for insomnia, no recent reports of increased s/s. Per staff, today has had increased lethargy. Did not want to get OOB. Amb, however keeps eyes closed. Staff assists with amb. No focal neurological changes. VSS. Per spouse, no h/o UTIs. O2 sats stable. Afebrile.       Past Medical and Surgical History reviewed in Epic today.    MEDICATIONS:    Current Outpatient Medications   Medication Sig Dispense Refill     mirtazapine (REMERON) 7.5 MG tablet Take 7.5 mg by mouth At Bedtime       polyethylene glycol (MIRALAX) 17 GM/Dose powder Take 17 g by mouth every other day 510 g      risperiDONE (RISPERDAL) 0.25 MG tablet Take 1 tablet (0.25 mg) by mouth 2 times daily       vitamin D3 (CHOLECALCIFEROL) 50 mcg (2000 units) tablet Take 1 tablet (50 mcg) by mouth three times a week           REVIEW OF SYSTEMS:  Unobtainable secondary to cognitive impairment.     Objective:  /69   Pulse 61   Temp 97.6  F (36.4  C)   Resp 16   LMP  (LMP Unknown)   SpO2 95%   Exam:  GENERAL APPEARANCE:  in no distress, cooperative, sleepy  ENT:  Mouth and posterior oropharynx normal, moist mucous membranes, no rhinorrhea  EYES:  PERRL, Conjunctiva and lids normal, opens eye for short time  NECK:  FROM  RESP:  respiratory effort and palpation of chest normal, lungs clear to  auscultation , no respiratory distress  CV:  Palpation and auscultation of heart done , regular rate and rhythm, no murmur, rub, or gallop, no edema  ABDOMEN:  normal bowel sounds, soft, nontender, no hepatosplenomegaly or other masses, no guarding or rebound  M/S:   muscle strength 5/5 all 4 ext, normal tone. bilat grasp strength equal  NEURO:   neuros gross intact. no facial droop. no increased muscle rigidity  PSYCH:  no apparent anxiety or restlessness.     Labs:     Most Recent 3 CBC's:Recent Labs   Lab Test 11/23/21  1645 11/18/20  1218 09/11/17  1712   WBC 6.2  --   --    HGB 13.4 12.6 12.5   MCV 96  --   --      --   --      Most Recent 3 BMP's:Recent Labs   Lab Test 11/23/21  1645 11/18/20  1218 09/11/17  1712    140 142   POTASSIUM 4.4 4.3 4.2   CHLORIDE 106 108 108   CO2 29 26 28   BUN 16 15 23   CR 0.68 0.75 0.64   ANIONGAP 4 6 6   PAOLA 9.3 9.3 9.0   GLC 92 79 99       ASSESSMENT/PLAN:  (R53.83) Lethargy  (primary encounter diagnosis)  Comment: newer onset, some increase in lethargy yesterday. Did eat dinner last night. Did not eat breakfast, lunch today. Unclear etiology. No focal Neurological deficits. VSS  Plan: 1. Hold risperdal  2. Hold remeron  3. Encourage fluid intake today  4. Monitor for fever, changes in Neuros  5. Assist with amb.     (G30.9,  F02.C18) Severe Alzheimer's dementia with other behavioral disturbance, unspecified timing of dementia onset (H)  Comment: decline in language, function. Current increase in lethargy  Plan: 1. Hold remeron, risperdal as above  2. Monitor ongoing lethargy  3. Restart risperdal for anxiety, agitation. Spouse present for exam, would like to monitor for now while holding meds    (F51.01) Primary insomnia  Comment: per staff, no recent noted increase in s/s. Current increase in daytime sleepiness  Plan: 1. Hold remeron as above  2. Monitor for hs wandering    Electronically signed by:  JANELLE Mills CNP

## 2023-04-12 NOTE — LETTER
4/12/2023        RE: Afia Sylvester  C/o Edwin Sylvester  [address]  [facility] [address]        [facility] [facility]  [place] Medical Record Number:  [id number]  Place of Service where encounter took place:  [facility] ([facility]) [233]  Chief Complaint   Patient presents with     RECHECK     Fatigue       HPI:    Afia Sylvester  is a 77 year old (1945), who is being seen today for an episodic care visit.  HPI information obtained from: facility chart records, facility staff, [facility] chart review and family/first contact spouse report. Today's concern is: lethargy, alzheimer's.  Has alzheimer's with anxiety, wandering. Cont. On risperdal. Has not had aggressive behaviors, responds to staff redirection. Cont. On remeron for insomnia, no recent reports of increased s/s. Per staff, today has had increased lethargy. Did not want to get OOB. Amb, however keeps eyes closed. Staff assists with amb. No focal neurological changes. VSS. Per spouse, no h/o UTIs. O2 sats stable. Afebrile.       Past Medical and Surgical History reviewed in Epic today.    MEDICATIONS:    Current Outpatient Medications   Medication Sig Dispense Refill     mirtazapine (REMERON) 7.5 MG tablet Take 7.5 mg by mouth At Bedtime       polyethylene glycol (MIRALAX) 17 GM/Dose powder Take 17 g by mouth every other day 510 g      risperiDONE (RISPERDAL) 0.25 MG tablet Take 1 tablet (0.25 mg) by mouth 2 times daily       vitamin D3 (CHOLECALCIFEROL) 50 mcg (2000 units) tablet Take 1 tablet (50 mcg) by mouth three times a week           REVIEW OF SYSTEMS:  Unobtainable secondary to cognitive impairment.     Objective:  /69   Pulse 61   Temp 97.6  F (36.4  C)   Resp 16   LMP  (LMP Unknown)   SpO2 95%   Exam:  GENERAL APPEARANCE:  in no distress, cooperative, sleepy  ENT:  Mouth and posterior oropharynx normal, moist mucous membranes, no rhinorrhea  EYES:  PERRL, Conjunctiva and lids normal, opens  eye for short time  NECK:  FROM  RESP:  respiratory effort and palpation of chest normal, lungs clear to auscultation , no respiratory distress  CV:  Palpation and auscultation of heart done , regular rate and rhythm, no murmur, rub, or gallop, no edema  ABDOMEN:  normal bowel sounds, soft, nontender, no hepatosplenomegaly or other masses, no guarding or rebound  M/S:   muscle strength 5/5 all 4 ext, normal tone. bilat grasp strength equal  NEURO:   neuros gross intact. no facial droop. no increased muscle rigidity  PSYCH:  no apparent anxiety or restlessness.     Labs:     Most Recent 3 CBC's:Recent Labs   Lab Test 11/23/21  1645 11/18/20  1218 09/11/17  1712   WBC 6.2  --   --    HGB 13.4 12.6 12.5   MCV 96  --   --      --   --      Most Recent 3 BMP's:Recent Labs   Lab Test 11/23/21  1645 11/18/20  1218 09/11/17  1712    140 142   POTASSIUM 4.4 4.3 4.2   CHLORIDE 106 108 108   CO2 29 26 28   BUN 16 15 23   CR 0.68 0.75 0.64   ANIONGAP 4 6 6   PAOLA 9.3 9.3 9.0   GLC 92 79 99       ASSESSMENT/PLAN:  (R53.83) Lethargy  (primary encounter diagnosis)  Comment: newer onset, some increase in lethargy yesterday. Did eat dinner last night. Did not eat breakfast, lunch today. Unclear etiology. No focal Neurological deficits. VSS  Plan: 1. Hold risperdal  2. Hold remeron  3. Encourage fluid intake today  4. Monitor for fever, changes in Neuros  5. Assist with amb.     (G30.9,  F02.C18) Severe Alzheimer's dementia with other behavioral disturbance, unspecified timing of dementia onset (H)  Comment: decline in language, function. Current increase in lethargy  Plan: 1. Hold remeron, risperdal as above  2. Monitor ongoing lethargy  3. Restart risperdal for anxiety, agitation. Spouse present for exam, would like to monitor for now while holding meds    (F51.01) Primary insomnia  Comment: per staff, no recent noted increase in s/s. Current increase in daytime sleepiness  Plan: 1. Hold remeron as above  2. Monitor for  zeny aviles    Electronically signed by:  JANELLE Mills CNP                 Sincerely,        JANELLE Mills CNP

## 2023-04-14 NOTE — PROGRESS NOTES
[place] [facility]  [place] [facility] Record Number:  [id number]  Place of Service where encounter took place:  [facility] ([facility]) [233]  Chief Complaint   Patient presents with     Pain       HPI:    Afia Sylvester  is a 77 year old (1945), who is being seen today for an episodic care visit.  HPI information obtained from: facility chart records, facility staff, [facility] chart review and family/first contact spouse report. Today's concern is: pain, lethargy, alzheimer's. Earlier this week had increased lethargy, decrease in amb. Decrease in po intake. Risperdal, remeron remains on hold. Has had more alertness, however has ongoing decrease in po intake, s/s pain yesterday, today. Diff. To assess pain give alzheimer's.  Per staff moves bowels yesterday. Pain with transfers, able to amb with hand hold assist. Appears uncomfortable with sitting in chair at times.  No recent h/o spine pain, issues. No recent h/o UTI.       Past Medical and Surgical History reviewed in Epic today.    MEDICATIONS:    Current Outpatient Medications   Medication Sig Dispense Refill     ciprofloxacin (CIPRO) 250 MG tablet Take 250 mg by mouth 2 times daily       mirtazapine (REMERON) 7.5 MG tablet Take 7.5 mg by mouth At Bedtime       polyethylene glycol (MIRALAX) 17 GM/Dose powder Take 17 g by mouth every other day 510 g      risperiDONE (RISPERDAL) 0.25 MG tablet Take 1 tablet (0.25 mg) by mouth 2 times daily       vitamin D3 (CHOLECALCIFEROL) 50 mcg (2000 units) tablet Take 1 tablet (50 mcg) by mouth three times a week           REVIEW OF SYSTEMS:  Unobtainable secondary to cognitive impairment.     Objective:  /77   Pulse 78   Temp 97.6  F (36.4  C)   Resp 16   LMP  (LMP Unknown)   SpO2 94%   Exam:  GENERAL APPEARANCE:  anxious at times. alert when transferring. sleepy after breakfast. ate little at breakfast. fluid intake ok  ENT:  Mouth and posterior oropharynx normal, moist mucous  membranes, no rhinorrhea  EYES:  EOM, conjunctivae, lids, pupils and irises normal, PERRL  NECK:  no carotid bruit, FROM  RESP:  respiratory effort and palpation of chest normal, lungs clear to auscultation , no respiratory distress  CV:  Palpation and auscultation of heart done , regular rate and rhythm, no murmur, rub, or gallop, no edema  ABDOMEN:  hypoactive bowel sound, soft. some gaurding with palp. at times. no bruit  M/S:   muscle strength 5/5, some difficulty, requres assist to sit up in bed, stand. gait steady with hand hold assist. no apparent pain with PROM LEs. no apparent spine pain with palp. UE ROM wnl.   NEURO:   sleepy at times. no tremor seen. no increase in muscle rigidity  PSYCH:  affect abnormal -flat, appears restless at times when sitting in chair in dining room    Labs:   no recent    ASSESSMENT/PLAN:  (R52) Pain  (primary encounter diagnosis)  Comment: newer onset, unclear etiology. No recent falls witnessed. No h/o spine issues per spouse. Possible abd pain, referred pain r/t UTI.    Plan: 1. Start cipro given abd pain, recent increase in lethargy. Incont. On urine, would not be able to collect urine specimen.  2. Spouse wants more comfort focus at this time, dose not want sent to ED  3. Start tid tylenol, tid voltaren gel to back  4. Increase amb, act. Level as simón      (R53.83) Lethargy  Comment: some recent increase in s/s. More alert past couple days  Plan: 1. Cont. To hold remeron, risperdal  2. Encourage po intake  3. Monitor for insomnia, changes in Neuros  4. Start cipro as above    (G30.9,  F02.C18) Severe Alzheimer's dementia with other behavioral disturbance, unspecified timing of dementia onset (H)  Comment: recent lethargy, decrease in po intake. Per spouse wishes, wants more comfort focus at this time  Plan: 1. For increased agitation, use prn risperdal  2. Follow po intake, further functional decline, may consider hospice    Electronically signed by:  Chris Mancuso  APRN CNP

## 2023-04-14 NOTE — LETTER
4/14/2023        RE: Afia Sylvester  C/o Edwin Sylvester  [address]  [facility] [address]        [place] [facility]  [place] [facility] Record Number:  [id number]  Place of Service where encounter took place:  [facility] ([facility]) [233]  Chief Complaint   Patient presents with     Pain       HPI:    Afia Sylvester  is a 77 year old (1945), who is being seen today for an episodic care visit.  HPI information obtained from: facility chart records, facility staff, [facility] chart review and family/first contact spouse report. Today's concern is: pain, lethargy, alzheimer's. Earlier this week had increased lethargy, decrease in amb. Decrease in po intake. Risperdal, remeron remains on hold. Has had more alertness, however has ongoing decrease in po intake, s/s pain yesterday, today. Diff. To assess pain give alzheimer's.  Per staff moves bowels yesterday. Pain with transfers, able to amb with hand hold assist. Appears uncomfortable with sitting in chair at times.  No recent h/o spine pain, issues. No recent h/o UTI.       Past Medical and Surgical History reviewed in Epic today.    MEDICATIONS:    Current Outpatient Medications   Medication Sig Dispense Refill     ciprofloxacin (CIPRO) 250 MG tablet Take 250 mg by mouth 2 times daily       mirtazapine (REMERON) 7.5 MG tablet Take 7.5 mg by mouth At Bedtime       polyethylene glycol (MIRALAX) 17 GM/Dose powder Take 17 g by mouth every other day 510 g      risperiDONE (RISPERDAL) 0.25 MG tablet Take 1 tablet (0.25 mg) by mouth 2 times daily       vitamin D3 (CHOLECALCIFEROL) 50 mcg (2000 units) tablet Take 1 tablet (50 mcg) by mouth three times a week           REVIEW OF SYSTEMS:  Unobtainable secondary to cognitive impairment.     Objective:  /77   Pulse 78   Temp 97.6  F (36.4  C)   Resp 16   LMP  (LMP Unknown)   SpO2 94%   Exam:  GENERAL APPEARANCE:  anxious at times. alert when transferring. sleepy after  breakfast. ate little at breakfast. fluid intake ok  ENT:  Mouth and posterior oropharynx normal, moist mucous membranes, no rhinorrhea  EYES:  EOM, conjunctivae, lids, pupils and irises normal, PERRL  NECK:  no carotid bruit, FROM  RESP:  respiratory effort and palpation of chest normal, lungs clear to auscultation , no respiratory distress  CV:  Palpation and auscultation of heart done , regular rate and rhythm, no murmur, rub, or gallop, no edema  ABDOMEN:  hypoactive bowel sound, soft. some gaurding with palp. at times. no bruit  M/S:   muscle strength 5/5, some difficulty, requres assist to sit up in bed, stand. gait steady with hand hold assist. no apparent pain with PROM LEs. no apparent spine pain with palp. UE ROM wnl.   NEURO:   sleepy at times. no tremor seen. no increase in muscle rigidity  PSYCH:  affect abnormal -flat, appears restless at times when sitting in chair in dining room    Labs:   no recent    ASSESSMENT/PLAN:  (R52) Pain  (primary encounter diagnosis)  Comment: newer onset, unclear etiology. No recent falls witnessed. No h/o spine issues per spouse. Possible abd pain, referred pain r/t UTI.    Plan: 1. Start cipro given abd pain, recent increase in lethargy. Incont. On urine, would not be able to collect urine specimen.  2. Spouse wants more comfort focus at this time, dose not want sent to ED  3. Start tid tylenol, tid voltaren gel to back  4. Increase amb, act. Level as simón      (R53.83) Lethargy  Comment: some recent increase in s/s. More alert past couple days  Plan: 1. Cont. To hold remeron, risperdal  2. Encourage po intake  3. Monitor for insomnia, changes in Neuros  4. Start cipro as above    (G30.9,  F02.C18) Severe Alzheimer's dementia with other behavioral disturbance, unspecified timing of dementia onset (H)  Comment: recent lethargy, decrease in po intake. Per spouse wishes, wants more comfort focus at this time  Plan: 1. For increased agitation, use prn risperdal  2. Follow po  intake, further functional decline, may consider hospice    Electronically signed by:  JANELLE Mills CNP               Sincerely,        JANELLE Mills CNP

## 2023-04-19 NOTE — PROGRESS NOTES
[facility] [facility]  [place] Medical Record Number:  [id number]  Place of Service where encounter took place:  [facility] ([facility]) [233]  Chief Complaint   Patient presents with     Pain       HPI:    Afia Sylvester  is a 77 year old (1945), who is being seen today for an episodic care visit.  HPI information obtained from: facility chart records, facility staff, [facility] chart review and family/first contact spouse report. Today's concern is: pain, anxiety, alzheimer's. Had increased lethargy. Risperdal, remeron dc'd.  Has been more alert. Ongoing decrease in po intake. Has appeared to have pain-low back. Some decrease in act. Level. XR done of pelvis, hips, lumbar spine-all neg for acute changes. Started on prn MS which has been more effective. Also taking tramadol. Increase in anxiety past couple days. No longer taking psych meds for alzheimer's. Spouse requests hospice referral.       Past Medical and Surgical History reviewed in Epic today.    MEDICATIONS:    Current Outpatient Medications   Medication Sig Dispense Refill     LORazepam (ATIVAN) 0.5 MG tablet Take 1 tablet (0.5 mg) by mouth every 4 hours as needed for anxiety 60 tablet 5     morphine 10 MG/5ML solution Take 5 mg by mouth every 2 hours as needed for severe pain       senna-docusate (SENOKOT-S/PERICOLACE) 8.6-50 MG tablet Take 1 tablet by mouth daily       polyethylene glycol (MIRALAX) 17 GM/Dose powder Take 17 g by mouth every other day 510 g      traMADol (ULTRAM) 50 MG tablet Take 0.5 tablets (25 mg) by mouth 3 times daily. May also take 0.5 tablets (25 mg) 2 times daily as needed for severe pain. Do all this for 144 days. 60 tablet 5     vitamin D3 (CHOLECALCIFEROL) 50 mcg (2000 units) tablet Take 1 tablet (50 mcg) by mouth three times a week           REVIEW OF SYSTEMS:  Unobtainable secondary to cognitive impairment.     Objective:  /67   Pulse 73   Resp 18   LMP  (LMP Unknown)   SpO2 95%    Exam:  GENERAL APPEARANCE:  Alert, anxious, cooperative  ENT:  Mouth and posterior oropharynx normal, moist mucous membranes, [place]  EYES:  EOM, conjunctivae, lids, pupils and irises normal, PERRL  NECK:  FROM  RESP:  respiratory effort and palpation of chest normal, lungs clear to auscultation , no respiratory distress  CV:  Palpation and auscultation of heart done , regular rate and rhythm, no murmur, rub, or gallop, no edema  ABDOMEN:  normal bowel sounds, soft, nontender, no hepatosplenomegaly or other masses, no guarding or rebound  M/S:   muscle strength 5/5 all 4 ext., normal tone. appears uncomfortable at times when sitting  NEURO:   Cranial nerves 2-12 are normal tested and grossly at patient's baseline, no tremor  PSYCH:  affect abnormal -flat, appears anxious    Labs:     Most Recent 3 BMP's:Recent Labs   Lab Test 11/23/21  1645 11/18/20  1218 09/11/17  1712    140 142   POTASSIUM 4.4 4.3 4.2   CHLORIDE 106 108 108   CO2 29 26 28   BUN 16 15 23   CR 0.68 0.75 0.64   ANIONGAP 4 6 6   PAOLA 9.3 9.3 9.0   GLC 92 79 99       ASSESSMENT/PLAN:  (F41.9) Anxiety  (primary encounter diagnosis)  Comment: increase in s/s past couple days  Plan: LORazepam (ATIVAN) 0.5 MG tablet. q4 hr prn        2. Monitor freq. Of prn ativan taken, if effective for anxiety, low back pain, may sched. Doses  3. Monitor for insomnia-remeron dc'd.    (R52) Pain  Comment: ongoing s/s. Recent XRs neg for acute changes. Prn MS appears more effective than tramadol  Plan: 1. Increase prn MS to 5 mg q 2 hr prn  2. Ativan as above  3. Monitor for changes in gait, falls    (G30.9,  F02.C18) Severe Alzheimer's dementia with other behavioral disturbance, unspecified timing of dementia onset (H)  Comment: anxiety, decrease in po intake  Plan: 1. Refer to hospice  2. Ativan as above  3. May consider prn haldol once hospice starts    Electronically signed by:  JANELLE Mills CNP

## 2023-04-19 NOTE — LETTER
4/19/2023        RE: Afia Sylvester  C/o Edwin Sylvester  [address]  [facility] [address]        [facility]  [facility] [facility] Record Number:  [id number]  Place of Service where encounter took place:  [facility] ([facility]) [[id number]]  Chief Complaint   Patient presents with     Pain       HPI:    Afia Sylvester  is a 77 year old (1945), who is being seen today for an episodic care visit.  HPI information obtained from: facility chart records, facility staff, [facility] chart review and family/first contact spouse report. Today's concern is: pain, anxiety, alzheimer's. Had increased lethargy. Risperdal, remeron dc'd.  Has been more alert. Ongoing decrease in po intake. Has appeared to have pain-low back. Some decrease in act. Level. XR done of pelvis, hips, lumbar spine-all neg for acute changes. Started on prn MS which has been more effective. Also taking tramadol. Increase in anxiety past couple days. No longer taking psych meds for alzheimer's. Spouse requests hospice referral.       Past Medical and Surgical History reviewed in Epic today.    MEDICATIONS:    Current Outpatient Medications   Medication Sig Dispense Refill     LORazepam (ATIVAN) 0.5 MG tablet Take 1 tablet (0.5 mg) by mouth every 4 hours as needed for anxiety 60 tablet 5     morphine 10 MG/5ML solution Take 5 mg by mouth every 2 hours as needed for severe pain       senna-docusate (SENOKOT-S/PERICOLACE) 8.6-50 MG tablet Take 1 tablet by mouth daily       polyethylene glycol (MIRALAX) 17 GM/Dose powder Take 17 g by mouth every other day 510 g      traMADol (ULTRAM) 50 MG tablet Take 0.5 tablets (25 mg) by mouth 3 times daily. May also take 0.5 tablets (25 mg) 2 times daily as needed for severe pain. Do all this for 144 days. 60 tablet 5     vitamin D3 (CHOLECALCIFEROL) 50 mcg (2000 units) tablet Take 1 tablet (50 mcg) by mouth three times a week           REVIEW OF SYSTEMS:  Unobtainable secondary to  cognitive impairment.     Objective:  /67   Pulse 73   Resp 18   LMP  (LMP Unknown)   SpO2 95%   Exam:  GENERAL APPEARANCE:  Alert, anxious, cooperative  ENT:  Mouth and posterior oropharynx normal, moist mucous membranes, [place]  EYES:  EOM, conjunctivae, lids, pupils and irises normal, PERRL  NECK:  FROM  RESP:  respiratory effort and palpation of chest normal, lungs clear to auscultation , no respiratory distress  CV:  Palpation and auscultation of heart done , regular rate and rhythm, no murmur, rub, or gallop, no edema  ABDOMEN:  normal bowel sounds, soft, nontender, no hepatosplenomegaly or other masses, no guarding or rebound  M/S:   muscle strength 5/5 all 4 ext., normal tone. appears uncomfortable at times when sitting  NEURO:   Cranial nerves 2-12 are normal tested and grossly at patient's baseline, no tremor  PSYCH:  affect abnormal -flat, appears anxious    Labs:     Most Recent 3 BMP's:Recent Labs   Lab Test 11/23/21  1645 11/18/20  1218 09/11/17  1712    140 142   POTASSIUM 4.4 4.3 4.2   CHLORIDE 106 108 108   CO2 29 26 28   BUN 16 15 23   CR 0.68 0.75 0.64   ANIONGAP 4 6 6   PAOLA 9.3 9.3 9.0   GLC 92 79 99       ASSESSMENT/PLAN:  (F41.9) Anxiety  (primary encounter diagnosis)  Comment: increase in s/s past couple days  Plan: LORazepam (ATIVAN) 0.5 MG tablet. q4 hr prn        2. Monitor freq. Of prn ativan taken, if effective for anxiety, low back pain, may sched. Doses  3. Monitor for insomnia-remeron dc'd.    (R52) Pain  Comment: ongoing s/s. Recent XRs neg for acute changes. Prn MS appears more effective than tramadol  Plan: 1. Increase prn MS to 5 mg q 2 hr prn  2. Ativan as above  3. Monitor for changes in gait, falls    (G30.9,  F02.C18) Severe Alzheimer's dementia with other behavioral disturbance, unspecified timing of dementia onset (H)  Comment: anxiety, decrease in po intake  Plan: 1. Refer to hospice  2. Ativan as above  3. May consider prn haldol once hospice  starts    Electronically signed by:  JANELLE Mills CNP               Sincerely,        JANELLE Mills CNP

## 2023-04-19 NOTE — TELEPHONE ENCOUNTER
Prior Authorization Retail Medication Request    Medication/Dose: LORazepam (ATIVAN) 0.5 MG tablet  ICD code (if different than what is on RX):  Anxiety F41.9  Previously Tried and Failed:  N/A   Rationale:  SEE DX     Insurance Name:   [employer]    Insurance ID:  [id number]      Pharmacy Information (if different than what is on RX)

## 2023-05-16 ENCOUNTER — PATIENT OUTREACH (OUTPATIENT)
Dept: GERIATRIC MEDICINE | Facility: CLINIC | Age: 78
End: 2023-05-16
Payer: COMMERCIAL

## 2023-05-16 NOTE — PROGRESS NOTES
[employer] Medicare Disenrollment    Member was disenrolled from [employer] effective: 23  Reason for disenrollment: Death member  23    Marina TALAMANTES   [facility] Care Coordinator   [phone] - work cell phone   [phone] - work fax